# Patient Record
Sex: FEMALE | Race: WHITE | Employment: OTHER | URBAN - NONMETROPOLITAN AREA
[De-identification: names, ages, dates, MRNs, and addresses within clinical notes are randomized per-mention and may not be internally consistent; named-entity substitution may affect disease eponyms.]

---

## 2024-02-06 ENCOUNTER — OFFICE VISIT (OUTPATIENT)
Dept: FAMILY MEDICINE CLINIC | Age: 89
End: 2024-02-06
Payer: MEDICARE

## 2024-02-06 VITALS
DIASTOLIC BLOOD PRESSURE: 86 MMHG | HEART RATE: 64 BPM | WEIGHT: 145 LBS | HEIGHT: 62 IN | BODY MASS INDEX: 26.68 KG/M2 | OXYGEN SATURATION: 98 % | TEMPERATURE: 97.7 F | SYSTOLIC BLOOD PRESSURE: 130 MMHG

## 2024-02-06 DIAGNOSIS — R05.3 PERSISTENT COUGH: ICD-10-CM

## 2024-02-06 DIAGNOSIS — J40 BRONCHITIS: Primary | ICD-10-CM

## 2024-02-06 PROCEDURE — 99213 OFFICE O/P EST LOW 20 MIN: CPT | Performed by: EMERGENCY MEDICINE

## 2024-02-06 PROCEDURE — 1123F ACP DISCUSS/DSCN MKR DOCD: CPT | Performed by: EMERGENCY MEDICINE

## 2024-02-06 RX ORDER — GUAIFENESIN 600 MG/1
600 TABLET, EXTENDED RELEASE ORAL 2 TIMES DAILY
Qty: 30 TABLET | Refills: 0 | Status: SHIPPED | OUTPATIENT
Start: 2024-02-06 | End: 2024-02-21

## 2024-02-06 RX ORDER — CEFDINIR 300 MG/1
300 CAPSULE ORAL 2 TIMES DAILY
Qty: 20 CAPSULE | Refills: 0 | Status: SHIPPED | OUTPATIENT
Start: 2024-02-06 | End: 2024-02-16

## 2024-02-06 RX ORDER — BENZONATATE 100 MG/1
100 CAPSULE ORAL 3 TIMES DAILY PRN
Qty: 30 CAPSULE | Refills: 0 | Status: SHIPPED | OUTPATIENT
Start: 2024-02-06 | End: 2024-02-16

## 2024-02-06 RX ORDER — DONEPEZIL HYDROCHLORIDE 10 MG/1
10 TABLET, FILM COATED ORAL NIGHTLY
COMMUNITY

## 2024-02-06 RX ORDER — FERROUS SULFATE 325(65) MG
325 TABLET ORAL
COMMUNITY

## 2024-02-06 RX ORDER — MEMANTINE HYDROCHLORIDE 5 MG/1
5 TABLET ORAL 2 TIMES DAILY
COMMUNITY

## 2024-02-06 ASSESSMENT — ENCOUNTER SYMPTOMS
DIARRHEA: 0
EYE PAIN: 0
EYE REDNESS: 0
SINUS PRESSURE: 0
WHEEZING: 0
ABDOMINAL DISTENTION: 0
BACK PAIN: 0
EYE DISCHARGE: 0
SHORTNESS OF BREATH: 0
SORE THROAT: 0
VOMITING: 0
NAUSEA: 0
COUGH: 1

## 2024-02-06 NOTE — PROGRESS NOTES
orders for this visit:    Bronchitis  -     cefdinir (OMNICEF) 300 MG capsule; Take 1 capsule by mouth 2 times daily for 10 days  -     guaiFENesin (MUCINEX) 600 MG extended release tablet; Take 1 tablet by mouth 2 times daily for 15 days  -     benzonatate (TESSALON) 100 MG capsule; Take 1 capsule by mouth 3 times daily as needed for Cough    Persistent cough  -     XR CHEST (2 VW); Future  -     cefdinir (OMNICEF) 300 MG capsule; Take 1 capsule by mouth 2 times daily for 10 days  -     guaiFENesin (MUCINEX) 600 MG extended release tablet; Take 1 tablet by mouth 2 times daily for 15 days  -     benzonatate (TESSALON) 100 MG capsule; Take 1 capsule by mouth 3 times daily as needed for Cough         Discussed symptomatic treatments with the patient today.   Return if symptoms worsen or fail to improve.   Red flag symptoms were also discussed with the patient today. If symptoms worsen the patient is to go directly to the emergency department for reevaluation and treatment. Pt verbalizes understanding and is in agreement with plan of care. All questions answered.      New Medications     New Prescriptions    BENZONATATE (TESSALON) 100 MG CAPSULE    Take 1 capsule by mouth 3 times daily as needed for Cough    CEFDINIR (OMNICEF) 300 MG CAPSULE    Take 1 capsule by mouth 2 times daily for 10 days    GUAIFENESIN (MUCINEX) 600 MG EXTENDED RELEASE TABLET    Take 1 tablet by mouth 2 times daily for 15 days       Electronically signed by Kamaljit Kendall DO   DD: 2/6/24

## 2024-11-22 ENCOUNTER — HOSPITAL ENCOUNTER (INPATIENT)
Age: 89
LOS: 13 days | Discharge: HOME | DRG: 871 | End: 2024-12-05
Attending: INTERNAL MEDICINE | Admitting: INTERNAL MEDICINE
Payer: MEDICARE

## 2024-11-22 DIAGNOSIS — R00.1 BRADYCARDIA: Primary | ICD-10-CM

## 2024-11-22 PROBLEM — A41.9 SEPSIS (HCC): Status: ACTIVE | Noted: 2024-11-22

## 2024-11-22 PROBLEM — J90 PLEURAL EFFUSION: Status: ACTIVE | Noted: 2024-11-22

## 2024-11-22 PROBLEM — N30.00 ACUTE CYSTITIS WITHOUT HEMATURIA: Status: ACTIVE | Noted: 2024-11-22

## 2024-11-22 PROCEDURE — 3E033XZ INTRODUCTION OF VASOPRESSOR INTO PERIPHERAL VEIN, PERCUTANEOUS APPROACH: ICD-10-PCS | Performed by: INTERNAL MEDICINE

## 2024-11-22 PROCEDURE — 02HV33Z INSERTION OF INFUSION DEVICE INTO SUPERIOR VENA CAVA, PERCUTANEOUS APPROACH: ICD-10-PCS | Performed by: INTERNAL MEDICINE

## 2024-11-22 PROCEDURE — 87081 CULTURE SCREEN ONLY: CPT

## 2024-11-22 PROCEDURE — 93005 ELECTROCARDIOGRAM TRACING: CPT

## 2024-11-22 PROCEDURE — 99291 CRITICAL CARE FIRST HOUR: CPT

## 2024-11-22 PROCEDURE — 87086 URINE CULTURE/COLONY COUNT: CPT

## 2024-11-22 PROCEDURE — 87040 BLOOD CULTURE FOR BACTERIA: CPT

## 2024-11-22 PROCEDURE — 87150 DNA/RNA AMPLIFIED PROBE: CPT

## 2024-11-22 PROCEDURE — 87077 CULTURE AEROBIC IDENTIFY: CPT

## 2024-11-22 PROCEDURE — 2580000003 HC RX 258

## 2024-11-22 PROCEDURE — 0JH63XZ INSERTION OF TUNNELED VASCULAR ACCESS DEVICE INTO CHEST SUBCUTANEOUS TISSUE AND FASCIA, PERCUTANEOUS APPROACH: ICD-10-PCS | Performed by: INTERNAL MEDICINE

## 2024-11-22 PROCEDURE — 2000000000 HC ICU R&B

## 2024-11-22 PROCEDURE — 82533 TOTAL CORTISOL: CPT

## 2024-11-22 PROCEDURE — 2500000003 HC RX 250 WO HCPCS

## 2024-11-22 RX ORDER — MAGNESIUM SULFATE IN WATER 40 MG/ML
2000 INJECTION, SOLUTION INTRAVENOUS PRN
Status: DISCONTINUED | OUTPATIENT
Start: 2024-11-22 | End: 2024-12-05 | Stop reason: HOSPADM

## 2024-11-22 RX ORDER — SODIUM CHLORIDE 0.9 % (FLUSH) 0.9 %
5-40 SYRINGE (ML) INJECTION EVERY 12 HOURS SCHEDULED
Status: DISCONTINUED | OUTPATIENT
Start: 2024-11-22 | End: 2024-12-05 | Stop reason: HOSPADM

## 2024-11-22 RX ORDER — SODIUM CHLORIDE 9 MG/ML
INJECTION, SOLUTION INTRAVENOUS PRN
Status: DISCONTINUED | OUTPATIENT
Start: 2024-11-22 | End: 2024-12-05 | Stop reason: HOSPADM

## 2024-11-22 RX ORDER — ENOXAPARIN SODIUM 100 MG/ML
30 INJECTION SUBCUTANEOUS DAILY
Status: DISCONTINUED | OUTPATIENT
Start: 2024-11-23 | End: 2024-12-02 | Stop reason: DRUGHIGH

## 2024-11-22 RX ORDER — 0.9 % SODIUM CHLORIDE 0.9 %
1000 INTRAVENOUS SOLUTION INTRAVENOUS ONCE
Status: COMPLETED | OUTPATIENT
Start: 2024-11-22 | End: 2024-11-23

## 2024-11-22 RX ORDER — SODIUM CHLORIDE 0.9 % (FLUSH) 0.9 %
5-40 SYRINGE (ML) INJECTION PRN
Status: DISCONTINUED | OUTPATIENT
Start: 2024-11-22 | End: 2024-12-05 | Stop reason: HOSPADM

## 2024-11-22 RX ORDER — ONDANSETRON 4 MG/1
4 TABLET, ORALLY DISINTEGRATING ORAL EVERY 8 HOURS PRN
Status: DISCONTINUED | OUTPATIENT
Start: 2024-11-22 | End: 2024-12-05 | Stop reason: HOSPADM

## 2024-11-22 RX ORDER — GLUCAGON 1 MG/ML
1 KIT INJECTION PRN
Status: DISCONTINUED | OUTPATIENT
Start: 2024-11-22 | End: 2024-12-05 | Stop reason: HOSPADM

## 2024-11-22 RX ORDER — POTASSIUM CHLORIDE 7.45 MG/ML
10 INJECTION INTRAVENOUS PRN
Status: DISCONTINUED | OUTPATIENT
Start: 2024-11-22 | End: 2024-11-22 | Stop reason: RX

## 2024-11-22 RX ORDER — PANTOPRAZOLE SODIUM 40 MG/1
40 TABLET, DELAYED RELEASE ORAL
Status: DISCONTINUED | OUTPATIENT
Start: 2024-11-23 | End: 2024-12-05 | Stop reason: HOSPADM

## 2024-11-22 RX ORDER — SODIUM CHLORIDE 9 MG/ML
INJECTION, SOLUTION INTRAVENOUS CONTINUOUS
Status: DISCONTINUED | OUTPATIENT
Start: 2024-11-22 | End: 2024-11-24

## 2024-11-22 RX ORDER — ACETAMINOPHEN 325 MG/1
650 TABLET ORAL EVERY 6 HOURS PRN
Status: DISCONTINUED | OUTPATIENT
Start: 2024-11-22 | End: 2024-12-05 | Stop reason: HOSPADM

## 2024-11-22 RX ORDER — HYDROCORTISONE SODIUM SUCCINATE 100 MG/2ML
100 INJECTION INTRAMUSCULAR; INTRAVENOUS EVERY 8 HOURS
Status: DISCONTINUED | OUTPATIENT
Start: 2024-11-23 | End: 2024-11-26

## 2024-11-22 RX ORDER — DEXTROSE MONOHYDRATE 100 MG/ML
INJECTION, SOLUTION INTRAVENOUS CONTINUOUS PRN
Status: DISCONTINUED | OUTPATIENT
Start: 2024-11-22 | End: 2024-12-05 | Stop reason: HOSPADM

## 2024-11-22 RX ORDER — POLYETHYLENE GLYCOL 3350 17 G/17G
17 POWDER, FOR SOLUTION ORAL DAILY PRN
Status: DISCONTINUED | OUTPATIENT
Start: 2024-11-22 | End: 2024-12-05 | Stop reason: HOSPADM

## 2024-11-22 RX ORDER — POTASSIUM CHLORIDE 1500 MG/1
40 TABLET, EXTENDED RELEASE ORAL PRN
Status: DISCONTINUED | OUTPATIENT
Start: 2024-11-22 | End: 2024-12-05 | Stop reason: HOSPADM

## 2024-11-22 RX ORDER — ONDANSETRON 2 MG/ML
4 INJECTION INTRAMUSCULAR; INTRAVENOUS EVERY 6 HOURS PRN
Status: DISCONTINUED | OUTPATIENT
Start: 2024-11-22 | End: 2024-12-05 | Stop reason: HOSPADM

## 2024-11-22 RX ORDER — ACETAMINOPHEN 650 MG/1
650 SUPPOSITORY RECTAL EVERY 6 HOURS PRN
Status: DISCONTINUED | OUTPATIENT
Start: 2024-11-22 | End: 2024-12-05 | Stop reason: HOSPADM

## 2024-11-22 RX ADMIN — SODIUM CHLORIDE 1000 ML: 9 INJECTION, SOLUTION INTRAVENOUS at 23:26

## 2024-11-22 RX ADMIN — SODIUM CHLORIDE, PRESERVATIVE FREE 10 ML: 5 INJECTION INTRAVENOUS at 23:49

## 2024-11-22 RX ADMIN — SODIUM CHLORIDE 6 MCG/MIN: 9 INJECTION, SOLUTION INTRAVENOUS at 22:53

## 2024-11-23 ENCOUNTER — APPOINTMENT (OUTPATIENT)
Dept: GENERAL RADIOLOGY | Age: 89
DRG: 871 | End: 2024-11-23
Attending: INTERNAL MEDICINE
Payer: MEDICARE

## 2024-11-23 LAB
ALBUMIN SERPL-MCNC: 2 G/DL (ref 3.5–5.2)
ALP SERPL-CCNC: 82 U/L (ref 35–104)
ALT SERPL-CCNC: <5 U/L (ref 0–32)
ANION GAP SERPL CALCULATED.3IONS-SCNC: 8 MMOL/L (ref 7–16)
APPEARANCE FLD: CLEAR
AST SERPL-CCNC: 7 U/L (ref 0–31)
BASOPHILS # BLD: 0.05 K/UL (ref 0–0.2)
BASOPHILS NFR BLD: 0 % (ref 0–2)
BILIRUB SERPL-MCNC: 0.3 MG/DL (ref 0–1.2)
BODY FLD TYPE: NORMAL
BUN SERPL-MCNC: 32 MG/DL (ref 6–23)
CALCIUM SERPL-MCNC: 8.1 MG/DL (ref 8.6–10.2)
CHLORIDE SERPL-SCNC: 109 MMOL/L (ref 98–107)
CLOT CHECK: NORMAL
CO2 SERPL-SCNC: 22 MMOL/L (ref 22–29)
COLOR FLD: YELLOW
CORTIS SERPL-MCNC: 20.4 UG/DL (ref 2.7–18.4)
CREAT SERPL-MCNC: 1.8 MG/DL (ref 0.5–1)
CRP SERPL HS-MCNC: 129 MG/L (ref 0–5)
EOSINOPHIL # BLD: 0.01 K/UL (ref 0.05–0.5)
EOSINOPHILS RELATIVE PERCENT: 0 % (ref 0–6)
ERYTHROCYTE [DISTWIDTH] IN BLOOD BY AUTOMATED COUNT: 18.3 % (ref 11.5–15)
GFR, ESTIMATED: 27 ML/MIN/1.73M2
GLUCOSE FLD-MCNC: 156 MG/DL
GLUCOSE SERPL-MCNC: 117 MG/DL (ref 74–99)
HCT VFR BLD AUTO: 29.1 % (ref 34–48)
HGB BLD-MCNC: 8.5 G/DL (ref 11.5–15.5)
IMM GRANULOCYTES # BLD AUTO: 0.15 K/UL (ref 0–0.58)
IMM GRANULOCYTES NFR BLD: 1 % (ref 0–5)
INR PPP: 1.4
LACTATE BLDV-SCNC: 0.8 MMOL/L (ref 0.5–2.2)
LDH FLD L TO P-CCNC: 87 U/L
LDH SERPL-CCNC: 125 U/L (ref 135–214)
LYMPHOCYTES NFR BLD: 1.66 K/UL (ref 1.5–4)
LYMPHOCYTES RELATIVE PERCENT: 9 % (ref 20–42)
MAGNESIUM SERPL-MCNC: 1.6 MG/DL (ref 1.6–2.6)
MCH RBC QN AUTO: 25.7 PG (ref 26–35)
MCHC RBC AUTO-ENTMCNC: 29.2 G/DL (ref 32–34.5)
MCV RBC AUTO: 87.9 FL (ref 80–99.9)
MONOCYTES NFR BLD: 0.17 K/UL (ref 0.1–0.95)
MONOCYTES NFR BLD: 1 % (ref 2–12)
MONOCYTES NFR FLD: 70 %
NEUTROPHILS NFR BLD: 89 % (ref 43–80)
NEUTROPHILS NFR FLD: 30 %
NEUTS SEG NFR BLD: 17.06 K/UL (ref 1.8–7.3)
PHOSPHATE SERPL-MCNC: 3.9 MG/DL (ref 2.5–4.5)
PLATELET # BLD AUTO: 292 K/UL (ref 130–450)
PMV BLD AUTO: 8.5 FL (ref 7–12)
POTASSIUM SERPL-SCNC: 4.1 MMOL/L (ref 3.5–5)
PROT FLD-MCNC: 3.3 G/DL
PROT SERPL-MCNC: 4.9 G/DL (ref 6.4–8.3)
PROT SERPL-MCNC: 5.3 G/DL (ref 6.4–8.3)
PROTHROMBIN TIME: 15.1 SEC (ref 9.3–12.4)
RBC # BLD AUTO: 3.31 M/UL (ref 3.5–5.5)
RBC # FLD: 2000 CELLS/UL
SODIUM SERPL-SCNC: 139 MMOL/L (ref 132–146)
SPECIMEN TYPE: NORMAL
T4 FREE SERPL-MCNC: 0.8 NG/DL (ref 0.9–1.7)
TSH SERPL DL<=0.05 MIU/L-ACNC: 2.66 UIU/ML (ref 0.27–4.2)
WBC # FLD: 164 CELLS/UL
WBC OTHER # BLD: 19.1 K/UL (ref 4.5–11.5)

## 2024-11-23 PROCEDURE — 84443 ASSAY THYROID STIM HORMONE: CPT

## 2024-11-23 PROCEDURE — 2580000003 HC RX 258

## 2024-11-23 PROCEDURE — 83735 ASSAY OF MAGNESIUM: CPT

## 2024-11-23 PROCEDURE — 71045 X-RAY EXAM CHEST 1 VIEW: CPT

## 2024-11-23 PROCEDURE — 84157 ASSAY OF PROTEIN OTHER: CPT

## 2024-11-23 PROCEDURE — 2580000003 HC RX 258: Performed by: STUDENT IN AN ORGANIZED HEALTH CARE EDUCATION/TRAINING PROGRAM

## 2024-11-23 PROCEDURE — 83615 LACTATE (LD) (LDH) ENZYME: CPT

## 2024-11-23 PROCEDURE — 0W9B3ZZ DRAINAGE OF LEFT PLEURAL CAVITY, PERCUTANEOUS APPROACH: ICD-10-PCS | Performed by: STUDENT IN AN ORGANIZED HEALTH CARE EDUCATION/TRAINING PROGRAM

## 2024-11-23 PROCEDURE — 2700000000 HC OXYGEN THERAPY PER DAY

## 2024-11-23 PROCEDURE — 6370000000 HC RX 637 (ALT 250 FOR IP)

## 2024-11-23 PROCEDURE — 88112 CYTOPATH CELL ENHANCE TECH: CPT

## 2024-11-23 PROCEDURE — 88342 IMHCHEM/IMCYTCHM 1ST ANTB: CPT

## 2024-11-23 PROCEDURE — 84439 ASSAY OF FREE THYROXINE: CPT

## 2024-11-23 PROCEDURE — 88341 IMHCHEM/IMCYTCHM EA ADD ANTB: CPT

## 2024-11-23 PROCEDURE — 84100 ASSAY OF PHOSPHORUS: CPT

## 2024-11-23 PROCEDURE — 87205 SMEAR GRAM STAIN: CPT

## 2024-11-23 PROCEDURE — 83605 ASSAY OF LACTIC ACID: CPT

## 2024-11-23 PROCEDURE — 85610 PROTHROMBIN TIME: CPT

## 2024-11-23 PROCEDURE — 2000000000 HC ICU R&B

## 2024-11-23 PROCEDURE — 80053 COMPREHEN METABOLIC PANEL: CPT

## 2024-11-23 PROCEDURE — 87070 CULTURE OTHR SPECIMN AEROBIC: CPT

## 2024-11-23 PROCEDURE — 36592 COLLECT BLOOD FROM PICC: CPT

## 2024-11-23 PROCEDURE — 85025 COMPLETE CBC W/AUTO DIFF WBC: CPT

## 2024-11-23 PROCEDURE — 86140 C-REACTIVE PROTEIN: CPT

## 2024-11-23 PROCEDURE — 89051 BODY FLUID CELL COUNT: CPT

## 2024-11-23 PROCEDURE — 6360000002 HC RX W HCPCS

## 2024-11-23 PROCEDURE — 84155 ASSAY OF PROTEIN SERUM: CPT

## 2024-11-23 PROCEDURE — 82945 GLUCOSE OTHER FLUID: CPT

## 2024-11-23 PROCEDURE — 88305 TISSUE EXAM BY PATHOLOGIST: CPT

## 2024-11-23 RX ORDER — SODIUM CHLORIDE, SODIUM LACTATE, POTASSIUM CHLORIDE, AND CALCIUM CHLORIDE .6; .31; .03; .02 G/100ML; G/100ML; G/100ML; G/100ML
1000 INJECTION, SOLUTION INTRAVENOUS ONCE
Status: COMPLETED | OUTPATIENT
Start: 2024-11-23 | End: 2024-11-23

## 2024-11-23 RX ORDER — AMLODIPINE BESYLATE 2.5 MG/1
2.5 TABLET ORAL DAILY
COMMUNITY

## 2024-11-23 RX ORDER — SODIUM BICARBONATE 650 MG/1
650 TABLET ORAL 3 TIMES DAILY
COMMUNITY

## 2024-11-23 RX ORDER — GABAPENTIN 100 MG/1
100 CAPSULE ORAL 2 TIMES DAILY
COMMUNITY

## 2024-11-23 RX ORDER — ONDANSETRON 4 MG/1
4 TABLET, ORALLY DISINTEGRATING ORAL EVERY 8 HOURS PRN
COMMUNITY

## 2024-11-23 RX ADMIN — HYDROCORTISONE SODIUM SUCCINATE 100 MG: 100 INJECTION INTRAMUSCULAR; INTRAVENOUS at 22:39

## 2024-11-23 RX ADMIN — ENOXAPARIN SODIUM 30 MG: 100 INJECTION SUBCUTANEOUS at 08:33

## 2024-11-23 RX ADMIN — PANTOPRAZOLE SODIUM 40 MG: 40 TABLET, DELAYED RELEASE ORAL at 05:46

## 2024-11-23 RX ADMIN — PIPERACILLIN AND TAZOBACTAM 3375 MG: 3; .375 INJECTION, POWDER, LYOPHILIZED, FOR SOLUTION INTRAVENOUS at 12:16

## 2024-11-23 RX ADMIN — SODIUM CHLORIDE, POTASSIUM CHLORIDE, SODIUM LACTATE AND CALCIUM CHLORIDE 1000 ML: 600; 310; 30; 20 INJECTION, SOLUTION INTRAVENOUS at 11:08

## 2024-11-23 RX ADMIN — HYDROCORTISONE SODIUM SUCCINATE 100 MG: 100 INJECTION INTRAMUSCULAR; INTRAVENOUS at 00:04

## 2024-11-23 RX ADMIN — MAGNESIUM SULFATE HEPTAHYDRATE 2000 MG: 40 INJECTION, SOLUTION INTRAVENOUS at 07:36

## 2024-11-23 RX ADMIN — PIPERACILLIN AND TAZOBACTAM 3375 MG: 3; .375 INJECTION, POWDER, LYOPHILIZED, FOR SOLUTION INTRAVENOUS at 20:18

## 2024-11-23 RX ADMIN — SODIUM CHLORIDE: 9 INJECTION, SOLUTION INTRAVENOUS at 22:37

## 2024-11-23 RX ADMIN — PIPERACILLIN AND TAZOBACTAM 3375 MG: 3; .375 INJECTION, POWDER, LYOPHILIZED, FOR SOLUTION INTRAVENOUS at 03:34

## 2024-11-23 RX ADMIN — HYDROCORTISONE SODIUM SUCCINATE 100 MG: 100 INJECTION INTRAMUSCULAR; INTRAVENOUS at 08:32

## 2024-11-23 RX ADMIN — HYDROCORTISONE SODIUM SUCCINATE 100 MG: 100 INJECTION INTRAMUSCULAR; INTRAVENOUS at 17:23

## 2024-11-23 RX ADMIN — SODIUM CHLORIDE, PRESERVATIVE FREE 10 ML: 5 INJECTION INTRAVENOUS at 20:15

## 2024-11-23 RX ADMIN — SODIUM CHLORIDE: 9 INJECTION, SOLUTION INTRAVENOUS at 01:36

## 2024-11-23 RX ADMIN — SODIUM CHLORIDE: 9 INJECTION, SOLUTION INTRAVENOUS at 10:30

## 2024-11-23 RX ADMIN — SODIUM CHLORIDE, PRESERVATIVE FREE 10 ML: 5 INJECTION INTRAVENOUS at 08:33

## 2024-11-23 ASSESSMENT — PAIN SCALES - PAIN ASSESSMENT IN ADVANCED DEMENTIA (PAINAD)
BREATHING: NORMAL
CONSOLABILITY: NO NEED TO CONSOLE
TOTALSCORE: 0
BREATHING: NORMAL
BODYLANGUAGE: RELAXED
FACIALEXPRESSION: SMILING OR INEXPRESSIVE
BODYLANGUAGE: RELAXED
TOTALSCORE: 0
CONSOLABILITY: NO NEED TO CONSOLE
FACIALEXPRESSION: SMILING OR INEXPRESSIVE
TOTALSCORE: 0
FACIALEXPRESSION: SMILING OR INEXPRESSIVE
BODYLANGUAGE: RELAXED
CONSOLABILITY: NO NEED TO CONSOLE
BREATHING: NORMAL

## 2024-11-23 NOTE — FLOWSHEET NOTE
Patient admitted from [facility] ER per ambulance to 207, with the following belongings; shirt and pants, placed on monitor, patient oriented to room and unit visiting hours.  Patient guide at bedside, reviewed patient rights and responsibilities. MRSA nasal swab obtained.  Bed alarm on.  Call light within reach.

## 2024-11-23 NOTE — CONSULTS
Critical Care Admit/Consult Note         Patient - Estefany Levine   MRN -  [id number]   [id number]   - 1935      Date of Admission -  2024 10:25 PM  Date of evaluation -  2024/207-A   Hospital Day - 0            ADMIT/CONSULT DETAILS     Reason for Admit/Consult   Septic shock    Consulting Service/Physician   Consulting - Caity Ann MD  Primary Care Physician - Yaw Pak,          HPI   The patient is a 89 y.o. female with significant past medical history of diabetes, CKD, HLD, HTN, OA, osteoporosis, uterine cancer, dementia, patient presented to outside emergency department ([facility]) from [facility] for evaluation of hypoxia, fatigue, and nausea and vomiting.  Per EMS report reported fever of 99, heart rate in the 110s, systolic blood pressure in the 80s.  She was placed on 10 L NRB and satting 100%.  EKG at outside facility reveals sinus tachycardia with rate of 104, occasional ectopy and no acute ST elevation or depression.  Labs at outside facility reveal white count 19.7, Hgb of 11.6, lactic acid 3.8, sodium 139, K+ 4.3, BUN 29, creatinine 1.53, magnesium 1.2, troponin 5.  UA was positive and a Montanez catheter was placed blood and urine cultures were drawn and she was given broad-spectrum antibiotics with vancomycin and Zosyn.  She received 2 L of IV fluids and systolics remained in the 80s 70s a central line was placed and she was initiated on norepinephrine.  No beds available at [facility] therefore transfer was initiated to [facility].  Patient was seen and examined here in ICU she is alert but not oriented she is hypotensive and maintained on norepinephrine.     Of note patient recently seen at [facility] for bilateral hydronephrosis with no stone involvement and treated for UTI was treated with nitrofurantoin and switch to Keflex.     Past Medical History         Diagnosis Date    Cataract     bilat    Chronic kidney disease, stage 3 (HCC)      and therapeutic thoracentesis check INR    Cardiovascular   # Shock   - 2/2 Septic shock and likely hypovolemia  - EKG and troponin at outside facility unremarkable, repeat troponin and EKG  - Check cortisol level, start stress dose steroids, continue fluid resuscitation continue norepinephrine for MAP > 65    # HTN  - Hold home meds    Gastrointestinal   # Nausea/vomiting  - Hx of uterine cancer s/p MOHINI and BSO, XRT and brachytherapy, s/p gastric bypass, s/p hernia repair x 2, s/p right colectomy for severe dysplasia colocutaneous fistula and associated 6 cm phlegmon since [facility] and not a surgical candidate  - CTAP no acute changes and stable chronic findings  - As needed antiemetic    # 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  - Repeat blood and urine culture, baseline CRP and PCT  - Continue Zosyn for now pending further workup will consult infectious ease; prior to recommendations    Hematology/Oncology   # Anemia  - Hgb stable 11.6, Daily CBC and transfuse for Hgb < 7    # Uterine cancer  - S/p MOHINI and BSO as well as XRT and brachytherapy     Endocrine   # Subclinical hypothyroidism  - TSH 2.39 Free T4 7.8 at outside facility, will repeat     # Type II DM  - Hold metformin  - Hypoglycemic protocol    # Neuropathy  - Hold gabapentin for now    Musculoskeletal   # Chronic subluxation of right shoulder  - 2/2 rotator cuff insufficiency    Social/Spiritual/DNR/Other   CODE STATUS:  Full code  DVT prophylaxis: Lovenox  GI prophylaxis: Protonix        \"Thank you for asking us to see this complex patient.\"     Case discussed with Dr. Collier  Electronically signed by MACK Power CNP on 11/22/2024 at 11:22 PM    Total critical care time caring for this patient with life threatening, unstable organ failure, including direct patient contact, management of life support systems, review of data including imaging and labs, discussions with other team members and physicians at least 40  Min so far today, excluding procedures. Please feel free to call with questions or concerns.     NOTE: This report was transcribed using voice recognition software. Every effort was made to ensure accuracy; however, inadvertent computerized transcription errors may be present.

## 2024-11-23 NOTE — H&P
Department of Internal Medicine  Peggy Guido MD    Estefany Levine  [id number]  1935  CHIEF COMPLAINT:  Sepsis (HCC)   History Obtained From: EMR  HISTORY OF PRESENT ILLNESS:    The patient is a 89 y.o. female who presents with fatigue, fever, nausea and vomiting.  When asked she was not sure why she is here.  She states she lives with her daughter.  As per EMS her blood pressure was also low.  Systolic blood pressure in the 80s.  She was hypoxic as well and was placed on NRB.  She was recently seen at [facility] for bilateral hydronephrosis and treated for UTI with nitrofurantoin and then switched to Keflex.  Has history of diabetes mellitus, hypertension, hyperlipidemia, osteoarthritis, chronic kidney disease, uterine cancer, dementia.  She is very pleasant and seems comfortable.  Has a breakfast in front of her this morning.  She has no complaints but to be noted that she is a poor historian.  Past Medical History:        Diagnosis Date    Cataract     bilat    Chronic kidney disease, stage 3 (HCC)     Diabetes mellitus (HCC)     Diarrhea     Gout     Hypertension     Obesity     Osteoarthritis     Osteoarthritis     Osteoporosis     Recurrent UTI     Uterine cancer (HCC)      Past Surgical History:        Procedure Laterality Date    ANKLE SURGERY      pinning    APPENDECTOMY      COLON SURGERY      COLONOSCOPY  09/22/2020    Dr. Najera    CT NEEDLE BIOPSY LUNG PERCUTANEOUS  3/24/2022    CT NEEDLE BIOPSY LUNG PERCUTANEOUS 3/24/2022    FOOT SURGERY Right     GASTRIC BYPASS SURGERY      HERNIA REPAIR      x2    HYSTERECTOMY (CERVIX STATUS UNKNOWN)  12/2001    SPINE SURGERY  04/20/2015    L4-L5 fusion  Dr Salvador     TONSILLECTOMY       Meds at Home:  Medications Prior to Admission: amLODIPine (NORVASC) 2.5 MG tablet, Take 1 tablet by mouth daily Hold if SBP < 120  sodium chloride 0.9 % SOLN 100 mL with ferric gluconate 12.5 MG/ML SOLN 125 mg, Infuse 125 mg intravenously every 7 days THURSDAYS  gabapentin

## 2024-11-23 NOTE — INTERDISCIPLINARY ROUNDS
Intensive Care Daily Quality Rounding Checklist      ICU Team Members: Dr. Lee, Resident Dr. Kerr, Bedside nurse, Charge nurse, Respiratory therapist     ICU Day #: NUMBER: 2    SOFA Score: 3    Intubation Date:  N/A    Ventilator Day #: N/A    Central Line Insertion Date: November 22        Day #: NUMBER: 2        Indication: CVCIndication: Hemodynamically unstable requiring volume resuscitation     Arterial Line Insertion Date: November 22      Day #: NUMBER: 2    Temporary Hemodialysis Catheter Insertion Date:  N/A      Day # N/A    DVT Prophylaxis: Lovenox    GI Prophylaxis: Protonix    Montanez Catheter Insertion Date: November 22       Day #: 2      Indications: Need for fluid management of the critically ill patient in a critical care setting      Continued need (if yes, reason documented and discussed with physician): Yes, pressor    Skin Issues/ Wounds and ordered treatment discussed on rounds: Yes, wound    Goals/ Plans for the Day: Monitor labs and vitals, treat/replace as needed.  Wean pressors as able, check NICOM.  Continue Zosyn.  Plan for possible thoracentesis.     Reviewed plan and goals for day with patient and/or representative: No, patient confused.

## 2024-11-23 NOTE — PROGRESS NOTES
SVI CI HR TFC MAP TPRI   Baseline 28 1.8 66 78.3 65 2821   Test 35 2.2 64 81.9 67 2448   % Change 24.9% 18.8 -3.7% 4.7% 3.1 -13.2   noninvasive -  24.9 % fluid responsive

## 2024-11-23 NOTE — CONSULTS
[facility]  [facility]    Consultation Note     Admit Date: 11/22/2024 10:25 PM    Reason for Consult:   Sepsis/UTI    Attending Physician:  Peggy Guido MD     Chief Complaint: none    HISTORY OF PRESENT ILLNESS:   The patient is a 89 y.o.  female not known to the Infectious Diseases service. The patient has complex surgical history with known colocutaneous fistula for many years, history of uterine cancer status post MOHINI/BSO and radiation therapy and brachytherapy history of gastric bypass surgery.  Patient was recently admitted to [facility] from 10/27 till 10/31.  Patient was treated with IV Zosyn and Diflucan was found to have right shoulder dislocation.  Patient was also found to have a left pleural effusion thought to be related to metastasis.  Antibiotics were switched to oral Keflex and patient was discharged.  Patient presented to ER from [facility] with nausea, vomiting, and hypoxia. Patient is sleepy did not provide any hx. She denied any pain any where.     Since admission patient is afebrile hypotensive admitted to ICU currently on Levophed saturating well on 2 L nasal cannula.  Labs showed white count of 19 hemoglobin is 8.5 platelet count is 292 BMP is normal CRP is 129 and BNP is 142 Pro-Lopez is 6.6 creatinine is 1.8 increased from 1.5 yesterday/BUN of 32.  UA shows pyuria patient tested negative for flu A/B/SARS-CoV-2 blood cultures are in process urine culture is in process MRSA nasal screen is in process.  CT chest showedVery large left pleural effusion encompassing nearly the entire left hemithorax with volume    loss of the underlying left lung.  No central mass lesion.  Shift of the mediastinum suspected on    plain films is not seen on CT.   CT abdomen pelvis showedStable open wound over the symphysis pubis with gas and debris most likely communicating with    the sigmoid colon when correlated with past study separation of the symphysis pubis with    Consulted gen surgery: any surgical options??  Monitor labs  Will follow with you    Thank you for having us see this patient in consultation. I will be discussing this case with the treating physicians.      Electronically signed by TIKA CHOI MD on 11/23/2024 at 10:13 AM

## 2024-11-23 NOTE — CONSULTS
Surgery Consult  JIMBO Obrien MD FACS    Reason for consult: chronic colocutaneous fistula    HPI  89 y.o. female with significant past medical history of diabetes, CKD, HLD, HTN, OA, osteoporosis, uterine cancer, uterine cancer s/p MOHINI and BSO and XRT and brachytherapy, s/p gastric bypass, s/p hernia repair x2, s/p R colectomy for severe dysplasia and colo-cutaneous fistula and associated 6cm phlegmon since 2020 .  Known fistula for many years.  10/27 was evaluated at [facility] for this and noted to have mesh protruding form fistula which was a newer development.  Plan was for colorectal team to evaluate the patient there but I do not see any notes for this.  The plan at that time was just to sharply excise any exposed mesh with hopes that the wound would contract.  CT from [facility] with colocutaneous fistula from sigmoid colon. She was to f/u with CRS at [facility] in the coming weeks. Patient presented to outside emergency department ([facility]) from [facility] for evaluation of hypoxia, fatigue, and nausea and vomiting.  UA was positive and a Montanez catheter was placed.  Blood and urine cultures were drawn and she was given broad-spectrum antibiotics with vancomycin and Zosyn.  Patient started on norepinephrine.  No beds available at [facility] therefore transfer was initiated to [facility].  She has been deemed not a surgical candidate for this given her complex surgical hx and hx of radiation.  This fistula has been managed with pouching and routine wound care.  UA with pyuria and imaging with large left pleural effusion encompassing the entire left hemithorax.  Attempted to upload the imaging disc but the images would not load.  CT report from [place] on 11/22 reviewed.      Past Medical History:   Diagnosis Date    Cataract     bilat    Chronic kidney disease, stage 3 (HCC)     Diabetes mellitus (HCC)     Diarrhea     Gout     Hypertension     Obesity     Osteoarthritis     Osteoarthritis     Osteoporosis                                   Signed    Patient: NAMAN STOKES                                                                  MR#: [id number]          : 1935                                                                Acct:[id number]            Age/Sex: 89 / F                                                                Admit Date: 24            Loc: ER                                                                                     Attending Dr:     Ordering Physician: Andrés Cash MD   Date of Service: 24  Procedure(s): CT angio chest PE protocol  Accession Number(s): [id number]    cc: Andrés Cash MD      HISTORY:  Tachycardia and hypotension.  Abnormal chest x-ray.     PHYSICIAN INDICATIONS:  septic, r/o pe, concern for tumor     TECHNIQUE: High resolution axial CT images of the chest with three-dimensional reconstruction on  separate workstation.  Study was performed with intravenous contrast for CT Angiogram.  CT Dose  Index: 3.8 mGy.  DLP: 8 mGy-cm AEC.  k=0.014 mSv/(mGy-cm)     FINDINGS:     The pulmonary arteries appear normal in caliber without filling defects to suggest pulmonary  emboli.     The aorta is normal in caliber without aneurysm, dissection or rupture.  The heart size is normal.     Very large left pleural effusion encompassing nearly the entire left hemithorax with volume loss of  the underlying left lung.  No central mass lesion.  Shift of the mediastinum suspected on plain  films is not seen on CT.     Small right pleural effusion with atelectasis in the right lung base.     No lung nodules are identified. The mediastinum is unremarkable with no significant adenopathy  identified. The hilar regions are unremarkable.     In the upper abdomen, visualized portions of the liver, adrenal glands, kidneys and spleen are  unremarkable.     IMPRESSION:     CT Angiogram Chest and Pulmonary Arteries with contrast and high resolution 2-D and 3-D

## 2024-11-23 NOTE — PROCEDURES
PROCEDURE NOTE  Date: 11/23/2024   Name: Estefany Levine  YOB: 1935      Thoracentesis Procedure Note  Indication: Pleural effusion, removal of fluid for diagnostic purposes, and removal of fluid for therapeutic purposes    Consent: The family members were counseled regarding the procedure, it's indications, risks, potential complications and alternatives and any questions were answered. Consent was obtained.    Procedure: The patient was placed in the sitting position, supported by a bed side stand and the appropriate landmarks were identified.  The skin over the puncture site in the left posterior chest wall was prepped with betadine and draped in a sterile fashion.  Local anesthesia was obtained by infiltration using 1% Lidocaine without epinephrine. A thoracentesis catheter was then advanced into the pleural space over a needle and the needle was withdrawn.  Pleural fluid was returned which was clear. A total volume of 2100 cc was withdrawn which was sent to the lab for appropriate testing.  The catheter was then withdrawn and a sterile dressing was placed over the site.  A post procedure film showed a decrease in the size of the effusion.    The patient tolerated the procedure well.    Complications: None     I was present to provide direct supervision for the above services.    Mike Lee MD  11/23/2024 4:58 PM

## 2024-11-23 NOTE — PROGRESS NOTES
Spiritual Health History and Assessment/Progress Note  [facility]    Attempted Encounter,  ,  ,      Name: Estefany Levine MRN: [id number]    Age: 89 y.o.     Sex: female   Language: English   Caodaism: None   Sepsis (HCC)     Date: 11/23/2024                           Spiritual Assessment began in [address] ICU        Referral/Consult From: Rounding   Encounter Overview/Reason: Attempted Encounter  Service Provided For: Patient    Giovanna, Belief, Meaning:   Patient unable to assess at this time  Family/Friends No family/friends present      Importance and Influence:  Patient unable to assess at this time  Family/Friends No family/friends present    Community:  Patient Patient was sleeping.   prayed a silent prayer for the patient   Family/Friends No family/friends present    Assessment and Plan of Care:     Patient Interventions include: Patient was sleeping.   prayed a silent prayer for the patient   Family/Friends Interventions include: No family/friends present    Patient Plan of Care: Spiritual Care available upon further referral  Family/Friends Plan of Care: Spiritual Care available upon further referral    Electronically signed by Chaplain Mone on 11/23/2024 at 10:21 AM

## 2024-11-23 NOTE — FLOWSHEET NOTE
4 Eyes Skin Assessment     NAME:  Estefany Levine  YOB: 1935  MEDICAL RECORD NUMBER:  [id number]    The patient is being assessed for  Admission    I agree that at least one RN has performed a thorough Head to Toe Skin Assessment on the patient. ALL assessment sites listed below have been assessed.      Areas assessed by both nurses:    Head, Face, Ears, Shoulders, Back, Chest, Arms, Elbows, Hands, Sacrum. Buttock, Coccyx, Ischium, Legs. Feet and Heels, and Under Medical Devices         Does the Patient have a Wound? Yes wound(s) were present on assessment. LDA wound assessment was Initiated and completed by RN       Scott Prevention initiated by RN: Yes  Wound Care Orders initiated by RN: Yes    Pressure Injury (Stage 3,4, Unstageable, DTI, NWPT, and Complex wounds) if present, place Wound referral order by RN under : Yes    New Ostomies, if present place, Ostomy referral order under : Yes     Nurse 1 eSignature: Electronically signed by Kaela Chu RN on 11/23/24 at 7:02 AM EST    **SHARE this note so that the co-signing nurse can place an eSignature**    Nurse 2 eSignature: {Esignature:[id number]}

## 2024-11-23 NOTE — FORENSIC NURSE
4 Eyes Skin Assessment     NAME:  Estefany Levine  YOB: 1935  MEDICAL RECORD NUMBER:  [id number]    The patient is being assessed for  Admission    I agree that at least one RN has performed a thorough Head to Toe Skin Assessment on the patient. ALL assessment sites listed below have been assessed.      Areas assessed by both nurses:    Head, Face, Ears, Shoulders, Back, Chest, Arms, Elbows, Hands, Sacrum. Buttock, Coccyx, Ischium, Legs. Feet and Heels, and Under Medical Devices         Does the Patient have a Wound? Yes wound(s) were present on assessment. LDA wound assessment was Initiated and completed by RN       Scott Prevention initiated by RN: Yes  Wound Care Orders initiated by RN: Yes    Pressure Injury (Stage 3,4, Unstageable, DTI, NWPT, and Complex wounds) if present, place Wound referral order by RN under : Yes    New Ostomies, if present place, Ostomy referral order under : Yes     Nurse 1 eSignature: Electronically signed by Kaela Chu RN on 11/23/24 at 12:47 AM EST    **SHARE this note so that the co-signing nurse can place an eSignature**    Nurse 2 eSignature: Electronically signed by Dionna Sargent RN on 11/23/24 at 12:50 AM EST

## 2024-11-23 NOTE — PROGRESS NOTES
Continuous PRN          VENT SETTINGS (Comprehensive) (if applicable):     Additional Respiratory Assessments  Pulse: 62  Respirations: 15  SpO2: 96 %    Arterial Blood Gas 11/23/2024  No results for input(s): \"PH\", \"PCO2\", \"PO2\", \"HCO3\", \"BE\", \"O2SAT\" in the last 72 hours.      Laboratory findings:    Complete Blood Count:   Recent Labs     11/23/24  0430   WBC 19.1*   HGB 8.5*   HCT 29.1*           Last 3 Blood Glucose:   Recent Labs     11/22/24  1331 11/23/24  0430   GLUCOSE 81 117*        PT/INR:    Lab Results   Component Value Date/Time    PROTIME 15.1 11/23/2024 04:30 AM    INR 1.4 11/23/2024 04:30 AM     PTT:    Lab Results   Component Value Date/Time    APTT 33.0 10/25/2024 06:34 AM       Comprehensive Metabolic Profile:   Recent Labs     11/22/24  1331 11/23/24  0430    139   K 4.3 4.1    109*   CO2 25 22   BUN 29* 32*   CREATININE 1.53* 1.8*   GLUCOSE 81 117*   CALCIUM 8.7 8.1*   BILITOT 0.4 0.3   ALKPHOS 70 82   AST 9* 7   ALT <5* <5      Magnesium:   Lab Results   Component Value Date/Time    MG 1.6 11/23/2024 04:30 AM     Phosphorus:   Lab Results   Component Value Date/Time    PHOS 3.9 11/23/2024 04:30 AM     Ionized Calcium: No results found for: \"CAION\"     Urinalysis:   Lab Results   Component Value Date/Time    COLORU YELLOW 11/22/2024 01:59 PM    NITRU Negative 09/05/2017 11:50 AM    GLUCOSEU NEGATIVE 11/22/2024 01:59 PM    KETUA NEGATIVE 11/22/2024 01:59 PM    UROBILINOGEN NEGATIVE 11/22/2024 01:59 PM    BILIRUBINUR NEGATIVE 11/22/2024 01:59 PM        Troponin: No results for input(s): \"TROPONINI\" in the last 72 hours.    Microbiology:  Cultures drawn: Blood pending and Urine pending    Radiology/Imaging:     Chest Xray (11/23/2024):  Xray Result (most recent):  XR CHEST PORTABLE 11/23/2024    Narrative  EXAMINATION:  ONE XRAY VIEW OF THE CHEST    11/23/2024 5:58 am    COMPARISON:  None.    HISTORY:  ORDERING SYSTEM PROVIDED HISTORY: Evaluate left pleural  effusion`  TECHNOLOGIST PROVIDED HISTORY:  Reason for exam:->Evaluate left pleural effusion`    FINDINGS:  Portable chest reveal complete opacification seen of the left hemithorax.  Deep line catheter on the right tip in the SVC.  The right lung is clear.  Degenerative changes seen within the spine.  Opacification throughout the  left hemithorax may be mixture of collapsed lung and pleural fluid.  Consider  CT scan of the chest for further evaluation if clinically indicated.    Impression  Complete opacification seen of the left hemithorax which may be mixture of  collapsed lung and pleural fluid. Consider CT scan of the chest for further  evaluation if clinically indicated       ASSESSMENT:     Patient Active Problem List    Diagnosis Date Noted    Sepsis (HCC) 11/22/2024    Pleural effusion 11/22/2024    Acute cystitis without hematuria 11/22/2024    Colocutaneous fistula     Sacral insufficiency fracture 12/01/2011    Insufficiency fracture of pelvis 12/01/2011         PLAN:     Neuro  Dementia  Continue home meds    Respiratory  Acute hypoxic respiratory failure   2/2 large left pleural effusion, no CT evidence of opacities or infiltrates rapid COVID and influenza negative  Wean oxygen as tolerated. Keep O2 sat 90-92%  Room air currently    Large left pleural effusion  Very large left pleural effusion encompassing the entire left hemothorax with volume loss of the underlying left lung  Not new was noted back in October and seen by oncology at [facility] who recommended in the absence of a primary cancer to see primary care provider upon discharge for possible PET scan  Images on disc from outside facility unable to be reviewed will repeat CXR in the morning  Would likely benefit from diagnostic and therapeutic thoracentesis check INR    Cardiovascular  Shock   2/2 Septic shock and likely hypovolemia  EKG and troponin at outside facility unremarkable, repeat troponin and EKG  Check cortisol level, start stress dose

## 2024-11-24 ENCOUNTER — APPOINTMENT (OUTPATIENT)
Dept: MRI IMAGING | Age: 89
DRG: 871 | End: 2024-11-24
Attending: INTERNAL MEDICINE
Payer: MEDICARE

## 2024-11-24 LAB
ALBUMIN SERPL-MCNC: 1.9 G/DL (ref 3.5–5.2)
ALP SERPL-CCNC: 74 U/L (ref 35–104)
ALT SERPL-CCNC: <5 U/L (ref 0–32)
ANION GAP SERPL CALCULATED.3IONS-SCNC: 8 MMOL/L (ref 7–16)
AST SERPL-CCNC: 7 U/L (ref 0–31)
BASOPHILS # BLD: 0.01 K/UL (ref 0–0.2)
BASOPHILS NFR BLD: 0 % (ref 0–2)
BILIRUB SERPL-MCNC: 0.2 MG/DL (ref 0–1.2)
BUN SERPL-MCNC: 31 MG/DL (ref 6–23)
CALCIUM SERPL-MCNC: 7.7 MG/DL (ref 8.6–10.2)
CHLORIDE SERPL-SCNC: 111 MMOL/L (ref 98–107)
CO2 SERPL-SCNC: 20 MMOL/L (ref 22–29)
CREAT SERPL-MCNC: 1.6 MG/DL (ref 0.5–1)
EOSINOPHIL # BLD: 0 K/UL (ref 0.05–0.5)
EOSINOPHILS RELATIVE PERCENT: 0 % (ref 0–6)
ERYTHROCYTE [DISTWIDTH] IN BLOOD BY AUTOMATED COUNT: 18.5 % (ref 11.5–15)
GFR, ESTIMATED: 31 ML/MIN/1.73M2
GLUCOSE SERPL-MCNC: 133 MG/DL (ref 74–99)
HCT VFR BLD AUTO: 25.9 % (ref 34–48)
HGB BLD-MCNC: 7.7 G/DL (ref 11.5–15.5)
IMM GRANULOCYTES # BLD AUTO: 0.12 K/UL (ref 0–0.58)
IMM GRANULOCYTES NFR BLD: 1 % (ref 0–5)
LACTATE BLDV-SCNC: 1.8 MMOL/L (ref 0.5–2.2)
LACTATE BLDV-SCNC: 2.3 MMOL/L (ref 0.5–2.2)
LYMPHOCYTES NFR BLD: 1.23 K/UL (ref 1.5–4)
LYMPHOCYTES RELATIVE PERCENT: 10 % (ref 20–42)
MAGNESIUM SERPL-MCNC: 2.1 MG/DL (ref 1.6–2.6)
MCH RBC QN AUTO: 25.8 PG (ref 26–35)
MCHC RBC AUTO-ENTMCNC: 29.7 G/DL (ref 32–34.5)
MCV RBC AUTO: 86.6 FL (ref 80–99.9)
MICROORGANISM SPEC CULT: NORMAL
MONOCYTES NFR BLD: 0.28 K/UL (ref 0.1–0.95)
MONOCYTES NFR BLD: 2 % (ref 2–12)
NEUTROPHILS NFR BLD: 87 % (ref 43–80)
NEUTS SEG NFR BLD: 10.81 K/UL (ref 1.8–7.3)
PHOSPHATE SERPL-MCNC: 3.1 MG/DL (ref 2.5–4.5)
PLATELET # BLD AUTO: 228 K/UL (ref 130–450)
PMV BLD AUTO: 8.5 FL (ref 7–12)
POTASSIUM SERPL-SCNC: 3.6 MMOL/L (ref 3.5–5)
PROT SERPL-MCNC: 4.8 G/DL (ref 6.4–8.3)
RBC # BLD AUTO: 2.99 M/UL (ref 3.5–5.5)
SERVICE CMNT-IMP: NORMAL
SODIUM SERPL-SCNC: 139 MMOL/L (ref 132–146)
SPECIMEN DESCRIPTION: NORMAL
WBC OTHER # BLD: 12.5 K/UL (ref 4.5–11.5)

## 2024-11-24 PROCEDURE — 36592 COLLECT BLOOD FROM PICC: CPT

## 2024-11-24 PROCEDURE — 93005 ELECTROCARDIOGRAM TRACING: CPT

## 2024-11-24 PROCEDURE — 6370000000 HC RX 637 (ALT 250 FOR IP)

## 2024-11-24 PROCEDURE — 72195 MRI PELVIS W/O DYE: CPT

## 2024-11-24 PROCEDURE — 80053 COMPREHEN METABOLIC PANEL: CPT

## 2024-11-24 PROCEDURE — 85025 COMPLETE CBC W/AUTO DIFF WBC: CPT

## 2024-11-24 PROCEDURE — 83605 ASSAY OF LACTIC ACID: CPT

## 2024-11-24 PROCEDURE — 2060000000 HC ICU INTERMEDIATE R&B

## 2024-11-24 PROCEDURE — 6360000002 HC RX W HCPCS

## 2024-11-24 PROCEDURE — 51702 INSERT TEMP BLADDER CATH: CPT

## 2024-11-24 PROCEDURE — 83735 ASSAY OF MAGNESIUM: CPT

## 2024-11-24 PROCEDURE — 84100 ASSAY OF PHOSPHORUS: CPT

## 2024-11-24 PROCEDURE — 2580000003 HC RX 258

## 2024-11-24 RX ORDER — PIPERACILLIN SODIUM, TAZOBACTAM SODIUM 3; .375 G/15ML; G/15ML
3375 INJECTION, POWDER, LYOPHILIZED, FOR SOLUTION INTRAVENOUS EVERY 8 HOURS
Status: DISCONTINUED | OUTPATIENT
Start: 2024-11-24 | End: 2024-11-25 | Stop reason: DRUGHIGH

## 2024-11-24 RX ORDER — SODIUM CHLORIDE 9 MG/ML
10 INJECTION, SOLUTION INTRAMUSCULAR; INTRAVENOUS; SUBCUTANEOUS EVERY 8 HOURS
Status: DISCONTINUED | OUTPATIENT
Start: 2024-11-24 | End: 2024-11-25 | Stop reason: DRUGHIGH

## 2024-11-24 RX ADMIN — SODIUM CHLORIDE: 9 INJECTION, SOLUTION INTRAVENOUS at 07:36

## 2024-11-24 RX ADMIN — ENOXAPARIN SODIUM 30 MG: 100 INJECTION SUBCUTANEOUS at 09:59

## 2024-11-24 RX ADMIN — PANTOPRAZOLE SODIUM 40 MG: 40 TABLET, DELAYED RELEASE ORAL at 05:59

## 2024-11-24 RX ADMIN — PIPERACILLIN AND TAZOBACTAM 3375 MG: 3; .375 INJECTION, POWDER, LYOPHILIZED, FOR SOLUTION INTRAVENOUS at 04:05

## 2024-11-24 RX ADMIN — HYDROCORTISONE SODIUM SUCCINATE 100 MG: 100 INJECTION INTRAMUSCULAR; INTRAVENOUS at 15:20

## 2024-11-24 RX ADMIN — HYDROCORTISONE SODIUM SUCCINATE 100 MG: 100 INJECTION INTRAMUSCULAR; INTRAVENOUS at 09:59

## 2024-11-24 RX ADMIN — SODIUM CHLORIDE, PRESERVATIVE FREE 10 ML: 5 INJECTION INTRAVENOUS at 09:59

## 2024-11-24 RX ADMIN — PIPERACILLIN AND TAZOBACTAM 3375 MG: 3; .375 INJECTION, POWDER, LYOPHILIZED, FOR SOLUTION INTRAVENOUS at 15:22

## 2024-11-24 ASSESSMENT — PAIN SCALES - PAIN ASSESSMENT IN ADVANCED DEMENTIA (PAINAD)
BREATHING: NORMAL
CONSOLABILITY: NO NEED TO CONSOLE
FACIALEXPRESSION: SMILING OR INEXPRESSIVE
FACIALEXPRESSION: SMILING OR INEXPRESSIVE
BODYLANGUAGE: RELAXED
TOTALSCORE: 0
BREATHING: NORMAL
CONSOLABILITY: NO NEED TO CONSOLE
BODYLANGUAGE: RELAXED
BODYLANGUAGE: RELAXED
FACIALEXPRESSION: SMILING OR INEXPRESSIVE
TOTALSCORE: 0
BODYLANGUAGE: RELAXED
BODYLANGUAGE: RELAXED
FACIALEXPRESSION: SMILING OR INEXPRESSIVE
TOTALSCORE: 0
BODYLANGUAGE: RELAXED
BREATHING: NORMAL
CONSOLABILITY: NO NEED TO CONSOLE
CONSOLABILITY: NO NEED TO CONSOLE
BREATHING: NORMAL
BREATHING: NORMAL
FACIALEXPRESSION: SMILING OR INEXPRESSIVE
CONSOLABILITY: NO NEED TO CONSOLE
TOTALSCORE: 0
BREATHING: NORMAL
FACIALEXPRESSION: SMILING OR INEXPRESSIVE
CONSOLABILITY: NO NEED TO CONSOLE

## 2024-11-24 NOTE — PROGRESS NOTES
Patient transferring from ICU room 207 to 411, report called to Fox, placed on telepack 411, patient belongings consisting of shirt, pants. transported with patient.

## 2024-11-24 NOTE — PROGRESS NOTES
Critical Care Team - Daily Progress Note      Date and time: 11/24/2024 9:08 AM  Patient's name:  Estefany Levine  Medical Record Number: [id number]  Patient's account/billing number: [id number]  Patient's YOB: 1935  Age: 89 y.o.  Date of Admission: 11/22/2024 10:25 PM  Length of stay during current admission: 2      Primary Care Physician: Yaw Pak DO  ICU Attending Physician: Dr. Lee    Code Status: Full Code    Reason for ICU admission: Septic shock      SUBJECTIVE:     OVERNIGHT EVENTS:       Patient resting comfortably in bed. Down to 2mcg/min levophed at this time. Yesterday performed thoracentesis and CXR appears improved with ex vacuole noted from removal of fluid. Patient had episode of Bradycardia during the night, and has had an increase in lactic acid. Fluid showed exudative effusion based on lights criteria on protein, believed by attending to be infiltrative.       Intake/Output:   No intake/output data recorded.  I/O last 3 completed shifts:  In: 5601.4 [P.O.:380; I.V.:3003.1; IV Piggyback:2218.3]  Out: 4075 [Urine:1700; Other:2100; Stool:275]    Awake and following commands: Yes  Current Ventilation: - No ventilator support  Secretions: Minimal  Sedation: none  Paralyzed: No  Vasopressors: norepinephrine    ROS:  Unless stated above, ROS is otherwise negative.      Initial HPI + past overnight events: The patient is a 89 y.o. female with significant past medical history of diabetes, CKD, HLD, HTN, OA, osteoporosis, uterine cancer, dementia, patient presented to outside emergency department ([facility]) from [facility] for evaluation of hypoxia, fatigue, and nausea and vomiting.  Per EMS report reported fever of 99, heart rate in the 110s, systolic blood pressure in the 80s.  She was placed on 10 L NRB and satting 100%.  EKG at outside facility reveals sinus tachycardia with rate of 104, occasional ectopy and no acute ST elevation or depression.  Labs at outside  that required frequent physician assessment. I devoted my full attention to the direct care of this patient for the period of time indicated below. In addition to above, time was devoted to teaching and to any procedure.     NOTE: This report, in part or full, may have been transcribed using voice recognition software. Every effort was made to ensure accuracy; however, inadvertent computerized transcription errors may be present. Please excuse any transcriptional grammatical or spelling errors that may have escaped my editorial review.    Total critical care time caring for this patient with life threatening, unstable organ failure, including direct patient contact, management of life support systems, review of data including imaging and labs, discussions with other team members and physicians is at least 37 Min so far today, excluding procedures.    Mike Lee MD 5:03 PM 11/24/2024

## 2024-11-24 NOTE — PROGRESS NOTES
Infectious Disease  Progress Note  [facility]    Chief Complaint: sepsis    Subjective:  she is feeling cold other wise ok. No fever. Tolerating antibiotics well. Pt is in ICU.    Scheduled Meds:   sodium chloride flush  5-40 mL IntraVENous 2 times per day    enoxaparin  30 mg SubCUTAneous Daily    piperacillin-tazobactam  3,375 mg IntraVENous Q8H    pantoprazole  40 mg Oral QAM AC    hydrocortisone sodium succinate PF  100 mg IntraVENous Q8H     Continuous Infusions:   norepinephrine Stopped (11/24/24 0956)    sodium chloride      dextrose       PRN Meds:sodium chloride flush, sodium chloride, potassium chloride **OR** potassium alternative oral replacement **OR** [DISCONTINUED] potassium chloride, magnesium sulfate, ondansetron **OR** ondansetron, polyethylene glycol, acetaminophen **OR** acetaminophen, glucose, dextrose bolus **OR** dextrose bolus, glucagon (rDNA), dextrose    Prior to Admission medications    Medication Sig Start Date End Date Taking? Authorizing Provider   amLODIPine (NORVASC) 2.5 MG tablet Take 1 tablet by mouth daily Hold if SBP < 120   Yes Delfino Gonzalez MD   sodium chloride 0.9 % SOLN 100 mL with ferric gluconate 12.5 MG/ML SOLN 125 mg Infuse 125 mg intravenously every 7 days THURSDAYS 11/21/24 12/26/24 Yes Delfino Gonzalez MD   gabapentin (NEURONTIN) 100 MG capsule Take 1 capsule by mouth 2 times daily.   Yes Delfino Gonzalez MD   ondansetron (ZOFRAN-ODT) 4 MG disintegrating tablet Take 1 tablet by mouth every 8 hours as needed for Nausea or Vomiting   Yes Delfino Gonzalez MD   sodium bicarbonate 650 MG tablet Take 1 tablet by mouth 3 times daily   Yes Delfino Gonzalez MD   donepezil (ARICEPT) 10 MG tablet Take 1 tablet by mouth nightly    Delfino Gonzalez MD   memantine (NAMENDA) 5 MG tablet Take 1 tablet by mouth 2 times daily    Delfino Gonzalez MD   Multiple Vitamin (MULTIVITAMIN ADULT PO) Take by mouth    Delfino Gonzalez MD   valsartan (DIOVAN)

## 2024-11-24 NOTE — PROGRESS NOTES
GENERAL SURGERY  DAILY PROGRESS NOTE  11/24/2024    No chief complaint on file.      ATTENDING PHYSICIAN PROGRESS NOTE      I have examined the patient and performed the key aspects of physical exam.  I have independently reviewed the record including all pertinent and new radiology images and laboratory findings as well as reviewed all pertinent provider documentation), and discussed the case with the surgical team.  I agree with the assessment and plan with the following additions, corrections, and changes. 14pt review of symptoms completed and negative except as mentioned.      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     Ostomy bag removed and wound inspected; no evidence of any infection at this area  Exposed mesh partially excised back to skin level  New appliance placed by bedside RN  No plans for acute surgical intervention  Rec f/u w [facility] as outlined in previous notes from her last visit there for this chronic colocutaneous fistula    JIMBO Obrien MD [facility]  Minimally Invasive General Surgery and Endoscopy  [facility] General [facility]  [address] [place] [address], [address]  [place] [address]  O: [phone]  F: [phone]      Electronically signed by Tony Obrien MD on 11/24/2024 at 10:17 AM

## 2024-11-24 NOTE — PATIENT CARE CONFERENCE
ICU Team Members: Dr. Lee, Resident Dr. Kerr, Bedside nurse, Charge nurse, Respiratory therapist      ICU Day #: NUMBER: 3     SOFA Score: 3     Intubation Date:  N/A     Ventilator Day #: N/A     Central Line Insertion Date: November 22                                                    Day #: NUMBER: 3                                                    Indication: CVCIndication: Hemodynamically unstable requiring volume resuscitation      Arterial Line Insertion Date: November 22                             Day #:    no arterial line in as of 11/24     Temporary Hemodialysis Catheter Insertion Date:  N/A                             Day # N/A     DVT Prophylaxis: Lovenox    GI Prophylaxis: Protonix     Montanez Catheter Insertion Date: November 22                                        Day #: 3                             Indications: Need for fluid management of the critically ill patient in a critical care setting                             Continued need (if yes, reason documented and discussed with physician): Yes, pressor     Skin Issues/ Wounds and ordered treatment discussed on rounds: Yes, wound     Goals/ Plans for the Day: Monitor labs and vitals, treat/replace as needed.  Wean pressors to keep MAP greater than 60, check NICOM.  Continue Zosyn. Order ECHO and EKG. Order lactic     Reviewed plan and goals for day with patient and/or representative: No, patient confused.

## 2024-11-24 NOTE — PROGRESS NOTES
Internal Medicine   Progress Note    Admit Date: 11/22/2024  Hospital day:    Hospital Day: 3  SUBJECTIVE:  89-year-old lady who was admitted with fever nausea and vomiting and with a diagnosis of sepsis.  She was hypertensive.  Her blood pressure still is a little low.  He has history of frequent UTI and cystitis.  She is a poor historian  No new acute problems overnight.  She states she is doing okay.  No chest pain or shortness of breath.  No nausea or vomiting.  No fever.  No abdominal pain.  OBJECTIVE:     BP (!) 81/51   Pulse 57   Temp 99.2 °F (37.3 °C) (Oral)   Resp 16   Wt 56.9 kg (125 lb 7.1 oz)   SpO2 97%   BMI 23.32 kg/m²   Patient Vitals for the past 24 hrs:   BP Temp Temp src Pulse Resp SpO2 Weight   11/24/24 1000 (!) 81/51 -- -- 57 16 -- --   11/24/24 0900 (!) 84/62 99.2 °F (37.3 °C) Oral -- 20 97 % --   11/24/24 0800 (!) 103/52 -- -- 58 18 -- --   11/24/24 0700 (!) 97/49 -- -- 52 17 -- --   11/24/24 0630 (!) 93/41 -- -- 50 16 -- --   11/24/24 0612 -- -- -- 59 18 -- --   11/24/24 0600 (!) 84/51 -- -- 73 13 -- --   11/24/24 0500 (!) 95/51 -- -- (!) 49 17 -- --   11/24/24 0400 (!) 84/38 97.6 °F (36.4 °C) Oral 73 20 -- --   11/24/24 0300 (!) 86/45 -- -- 63 20 92 % --   11/24/24 0200 (!) 88/49 -- -- 64 19 -- --   11/24/24 0130 (!) 101/52 -- -- 74 22 -- --   11/24/24 0100 (!) 94/55 -- -- 66 20 -- --   11/24/24 0033 -- -- -- 57 -- -- --   11/24/24 0030 (!) 100/53 -- -- 69 19 -- --   11/24/24 0000 90/73 97.5 °F (36.4 °C) -- 59 20 -- 56.9 kg (125 lb 7.1 oz)   11/23/24 2300 (!) 95/53 -- -- 58 18 91 % --   11/23/24 2200 (!) 94/57 -- -- 58 19 92 % --   11/23/24 2100 (!) 96/52 -- -- 67 21 -- --   11/23/24 2028 -- -- -- 59 -- -- --   11/23/24 2000 (!) 92/51 97.6 °F (36.4 °C) Oral 61 20 96 % --   11/23/24 1900 (!) 91/51 -- -- 65 19 -- --   11/23/24 1800 (!) 98/50 -- -- 88 19 -- --   11/23/24 1700 (!) 73/43 -- -- 62 18 -- --   11/23/24 1600 (!) 86/49 -- -- 70 21 -- --   11/23/24 1500 (!) 92/53 -- -- 60 18 -- --

## 2024-11-25 ENCOUNTER — APPOINTMENT (OUTPATIENT)
Age: 89
DRG: 871 | End: 2024-11-25
Attending: INTERNAL MEDICINE
Payer: MEDICARE

## 2024-11-25 PROBLEM — E43 SEVERE PROTEIN-CALORIE MALNUTRITION (HCC): Status: ACTIVE | Noted: 2024-11-25

## 2024-11-25 LAB
ALBUMIN SERPL-MCNC: 1.8 G/DL (ref 3.5–5.2)
ALP SERPL-CCNC: 68 U/L (ref 35–104)
ALT SERPL-CCNC: 6 U/L (ref 0–32)
ANION GAP SERPL CALCULATED.3IONS-SCNC: 8 MMOL/L (ref 7–16)
AST SERPL-CCNC: 13 U/L (ref 0–31)
BASOPHILS # BLD: 0 K/UL (ref 0–0.2)
BASOPHILS NFR BLD: 0 % (ref 0–2)
BILIRUB SERPL-MCNC: 0.2 MG/DL (ref 0–1.2)
BUN SERPL-MCNC: 28 MG/DL (ref 6–23)
CALCIUM SERPL-MCNC: 8.1 MG/DL (ref 8.6–10.2)
CHLORIDE SERPL-SCNC: 112 MMOL/L (ref 98–107)
CO2 SERPL-SCNC: 18 MMOL/L (ref 22–29)
CREAT SERPL-MCNC: 1.5 MG/DL (ref 0.5–1)
ECHO AO ASC DIAM: 2.9 CM
ECHO AO ASCENDING AORTA INDEX: 1.93 CM/M2
ECHO AV AREA PEAK VELOCITY: 3 CM2
ECHO AV AREA VTI: 3 CM2
ECHO AV AREA/BSA PEAK VELOCITY: 2 CM2/M2
ECHO AV AREA/BSA VTI: 2 CM2/M2
ECHO AV CUSP MM: 1.9 CM
ECHO AV MEAN GRADIENT: 3 MMHG
ECHO AV MEAN VELOCITY: 0.8 M/S
ECHO AV PEAK GRADIENT: 5 MMHG
ECHO AV PEAK VELOCITY: 1.2 M/S
ECHO AV VELOCITY RATIO: 0.83
ECHO AV VTI: 23.5 CM
ECHO BSA: 1.52 M2
ECHO EST RA PRESSURE: 3 MMHG
ECHO LA DIAMETER INDEX: 2.47 CM/M2
ECHO LA DIAMETER: 3.7 CM
ECHO LA VOL A-L A2C: 41 ML (ref 22–52)
ECHO LA VOL A-L A4C: 64 ML (ref 22–52)
ECHO LA VOL MOD A2C: 39 ML (ref 22–52)
ECHO LA VOL MOD A4C: 57 ML (ref 22–52)
ECHO LA VOLUME AREA LENGTH: 52 ML
ECHO LA VOLUME INDEX A-L A2C: 27 ML/M2 (ref 16–34)
ECHO LA VOLUME INDEX A-L A4C: 43 ML/M2 (ref 16–34)
ECHO LA VOLUME INDEX AREA LENGTH: 35 ML/M2 (ref 16–34)
ECHO LA VOLUME INDEX MOD A2C: 26 ML/M2 (ref 16–34)
ECHO LA VOLUME INDEX MOD A4C: 38 ML/M2 (ref 16–34)
ECHO LV E' LATERAL VELOCITY: 10 CM/S
ECHO LV E' SEPTAL VELOCITY: 11 CM/S
ECHO LV EF PHYSICIAN: 65 %
ECHO LV FRACTIONAL SHORTENING: 34 % (ref 28–44)
ECHO LV INTERNAL DIMENSION DIASTOLE INDEX: 2.33 CM/M2
ECHO LV INTERNAL DIMENSION DIASTOLIC: 3.5 CM (ref 3.9–5.3)
ECHO LV INTERNAL DIMENSION SYSTOLIC INDEX: 1.53 CM/M2
ECHO LV INTERNAL DIMENSION SYSTOLIC: 2.3 CM
ECHO LV ISOVOLUMETRIC RELAXATION TIME (IVRT): 106.1 MS
ECHO LV IVSD: 1 CM (ref 0.6–0.9)
ECHO LV IVSS: 1.1 CM
ECHO LV MASS 2D: 95.9 G (ref 67–162)
ECHO LV MASS INDEX 2D: 64 G/M2 (ref 43–95)
ECHO LV POSTERIOR WALL DIASTOLIC: 0.9 CM (ref 0.6–0.9)
ECHO LV POSTERIOR WALL SYSTOLIC: 1 CM
ECHO LV RELATIVE WALL THICKNESS RATIO: 0.51
ECHO LVOT AREA: 3.5 CM2
ECHO LVOT AV VTI INDEX: 0.87
ECHO LVOT DIAM: 2.1 CM
ECHO LVOT MEAN GRADIENT: 2 MMHG
ECHO LVOT PEAK GRADIENT: 4 MMHG
ECHO LVOT PEAK VELOCITY: 1 M/S
ECHO LVOT STROKE VOLUME INDEX: 47.3 ML/M2
ECHO LVOT SV: 71 ML
ECHO LVOT VTI: 20.5 CM
ECHO MV "A" WAVE DURATION: 166.1 MSEC
ECHO MV A VELOCITY: 0.65 M/S
ECHO MV AREA PHT: 2.4 CM2
ECHO MV AREA VTI: 2.3 CM2
ECHO MV E DECELERATION TIME (DT): 279.5 MS
ECHO MV E VELOCITY: 0.66 M/S
ECHO MV E/A RATIO: 1.02
ECHO MV E/E' LATERAL: 6.6
ECHO MV E/E' RATIO (AVERAGED): 6.3
ECHO MV E/E' SEPTAL: 6
ECHO MV LVOT VTI INDEX: 1.51
ECHO MV MAX VELOCITY: 0.9 M/S
ECHO MV MEAN GRADIENT: 1 MMHG
ECHO MV MEAN VELOCITY: 0.5 M/S
ECHO MV PEAK GRADIENT: 3 MMHG
ECHO MV PRESSURE HALF TIME (PHT): 90.8 MS
ECHO MV VTI: 31 CM
ECHO PV MAX VELOCITY: 1 M/S
ECHO PV MEAN GRADIENT: 2 MMHG
ECHO PV MEAN VELOCITY: 0.7 M/S
ECHO PV PEAK GRADIENT: 4 MMHG
ECHO PV VTI: 22.8 CM
ECHO RIGHT VENTRICULAR SYSTOLIC PRESSURE (RVSP): 28 MMHG
ECHO RV TAPSE: 2.8 CM (ref 1.7–?)
ECHO TV REGURGITANT MAX VELOCITY: 2.49 M/S
ECHO TV REGURGITANT PEAK GRADIENT: 25 MMHG
EKG ATRIAL RATE: 33 BPM
EKG ATRIAL RATE: 72 BPM
EKG P AXIS: 38 DEGREES
EKG P-R INTERVAL: 182 MS
EKG Q-T INTERVAL: 360 MS
EKG Q-T INTERVAL: 404 MS
EKG QRS DURATION: 72 MS
EKG QRS DURATION: 76 MS
EKG QTC CALCULATION (BAZETT): 389 MS
EKG QTC CALCULATION (BAZETT): 394 MS
EKG R AXIS: 36 DEGREES
EKG R AXIS: 46 DEGREES
EKG T AXIS: 4 DEGREES
EKG T AXIS: 52 DEGREES
EKG VENTRICULAR RATE: 56 BPM
EKG VENTRICULAR RATE: 72 BPM
EOSINOPHIL # BLD: 0 K/UL (ref 0.05–0.5)
EOSINOPHILS RELATIVE PERCENT: 0 % (ref 0–6)
ERYTHROCYTE [DISTWIDTH] IN BLOOD BY AUTOMATED COUNT: 18.5 % (ref 11.5–15)
GFR, ESTIMATED: 34 ML/MIN/1.73M2
GLUCOSE SERPL-MCNC: 92 MG/DL (ref 74–99)
HCT VFR BLD AUTO: 27.2 % (ref 34–48)
HGB BLD-MCNC: 7.4 G/DL (ref 11.5–15.5)
LYMPHOCYTES NFR BLD: 1.27 K/UL (ref 1.5–4)
LYMPHOCYTES RELATIVE PERCENT: 19 % (ref 20–42)
MAGNESIUM SERPL-MCNC: 1.9 MG/DL (ref 1.6–2.6)
MCH RBC QN AUTO: 25.8 PG (ref 26–35)
MCHC RBC AUTO-ENTMCNC: 27.2 G/DL (ref 32–34.5)
MCV RBC AUTO: 94.8 FL (ref 80–99.9)
MONOCYTES NFR BLD: 0 % (ref 2–12)
MONOCYTES NFR BLD: 0 K/UL (ref 0.1–0.95)
NEUTROPHILS NFR BLD: 81 % (ref 43–80)
NEUTS SEG NFR BLD: 5.43 K/UL (ref 1.8–7.3)
PHOSPHATE SERPL-MCNC: 3 MG/DL (ref 2.5–4.5)
PLATELET # BLD AUTO: 164 K/UL (ref 130–450)
PMV BLD AUTO: 9.7 FL (ref 7–12)
POTASSIUM SERPL-SCNC: 3.4 MMOL/L (ref 3.5–5)
PROT SERPL-MCNC: 4.6 G/DL (ref 6.4–8.3)
RBC # BLD AUTO: 2.87 M/UL (ref 3.5–5.5)
RBC # BLD: ABNORMAL 10*6/UL
SODIUM SERPL-SCNC: 138 MMOL/L (ref 132–146)
WBC OTHER # BLD: 6.7 K/UL (ref 4.5–11.5)

## 2024-11-25 PROCEDURE — 6370000000 HC RX 637 (ALT 250 FOR IP)

## 2024-11-25 PROCEDURE — C8929 TTE W OR WO FOL WCON,DOPPLER: HCPCS

## 2024-11-25 PROCEDURE — 2060000000 HC ICU INTERMEDIATE R&B

## 2024-11-25 PROCEDURE — 83735 ASSAY OF MAGNESIUM: CPT

## 2024-11-25 PROCEDURE — 36415 COLL VENOUS BLD VENIPUNCTURE: CPT

## 2024-11-25 PROCEDURE — 6360000004 HC RX CONTRAST MEDICATION

## 2024-11-25 PROCEDURE — 2580000003 HC RX 258

## 2024-11-25 PROCEDURE — 85025 COMPLETE CBC W/AUTO DIFF WBC: CPT

## 2024-11-25 PROCEDURE — 93306 TTE W/DOPPLER COMPLETE: CPT | Performed by: INTERNAL MEDICINE

## 2024-11-25 PROCEDURE — 93010 ELECTROCARDIOGRAM REPORT: CPT | Performed by: INTERNAL MEDICINE

## 2024-11-25 PROCEDURE — 80053 COMPREHEN METABOLIC PANEL: CPT

## 2024-11-25 PROCEDURE — 84100 ASSAY OF PHOSPHORUS: CPT

## 2024-11-25 PROCEDURE — 6360000002 HC RX W HCPCS

## 2024-11-25 RX ORDER — SODIUM CHLORIDE 9 MG/ML
10 INJECTION, SOLUTION INTRAMUSCULAR; INTRAVENOUS; SUBCUTANEOUS EVERY 12 HOURS
Status: DISPENSED | OUTPATIENT
Start: 2024-11-25 | End: 2024-11-30

## 2024-11-25 RX ORDER — PIPERACILLIN SODIUM, TAZOBACTAM SODIUM 3; .375 G/15ML; G/15ML
3375 INJECTION, POWDER, LYOPHILIZED, FOR SOLUTION INTRAVENOUS EVERY 12 HOURS
Status: DISCONTINUED | OUTPATIENT
Start: 2024-11-25 | End: 2024-11-26

## 2024-11-25 RX ADMIN — HYDROCORTISONE SODIUM SUCCINATE 100 MG: 100 INJECTION INTRAMUSCULAR; INTRAVENOUS at 00:35

## 2024-11-25 RX ADMIN — PIPERACILLIN AND TAZOBACTAM 3375 MG: 3; .375 INJECTION, POWDER, LYOPHILIZED, FOR SOLUTION INTRAVENOUS at 00:36

## 2024-11-25 RX ADMIN — PERFLUTREN 1.5 ML: 6.52 INJECTION, SUSPENSION INTRAVENOUS at 11:29

## 2024-11-25 RX ADMIN — PIPERACILLIN AND TAZOBACTAM 3375 MG: 3; .375 INJECTION, POWDER, LYOPHILIZED, FOR SOLUTION INTRAVENOUS at 12:47

## 2024-11-25 RX ADMIN — HYDROCORTISONE SODIUM SUCCINATE 100 MG: 100 INJECTION INTRAMUSCULAR; INTRAVENOUS at 08:30

## 2024-11-25 RX ADMIN — SODIUM CHLORIDE, PRESERVATIVE FREE 10 ML: 5 INJECTION INTRAVENOUS at 08:33

## 2024-11-25 RX ADMIN — HYDROCORTISONE SODIUM SUCCINATE 100 MG: 100 INJECTION INTRAMUSCULAR; INTRAVENOUS at 18:27

## 2024-11-25 RX ADMIN — SODIUM CHLORIDE, PRESERVATIVE FREE 10 ML: 5 INJECTION INTRAVENOUS at 00:37

## 2024-11-25 RX ADMIN — ENOXAPARIN SODIUM 30 MG: 100 INJECTION SUBCUTANEOUS at 08:30

## 2024-11-25 RX ADMIN — SODIUM CHLORIDE 10 ML: 9 INJECTION INTRAMUSCULAR; INTRAVENOUS; SUBCUTANEOUS at 00:37

## 2024-11-25 RX ADMIN — SODIUM CHLORIDE, PRESERVATIVE FREE 10 ML: 5 INJECTION INTRAVENOUS at 12:47

## 2024-11-25 RX ADMIN — SODIUM CHLORIDE, PRESERVATIVE FREE 10 ML: 5 INJECTION INTRAVENOUS at 21:04

## 2024-11-25 RX ADMIN — POTASSIUM CHLORIDE 40 MEQ: 1500 TABLET, EXTENDED RELEASE ORAL at 08:40

## 2024-11-25 RX ADMIN — PANTOPRAZOLE SODIUM 40 MG: 40 TABLET, DELAYED RELEASE ORAL at 06:00

## 2024-11-25 NOTE — PROGRESS NOTES
Comprehensive Nutrition Assessment    Type and Reason for Visit:  Initial, Consult, Wound    Nutrition Recommendations/Plan:   Continue diet per orders.  Added wound ONS BID & frozen ONS BID to optimize nutrient intake & promote healing. Meets criteria for severe PCM. Will monitor.      Malnutrition Assessment:  Malnutrition Status:  Severe malnutrition (11/25/24 4462)    Context:  Chronic Illness     Findings of the 6 clinical characteristics of malnutrition:  Energy Intake:  Unable to assess (unable to quantify)  Weight Loss:  5% over 1 month     Body Fat Loss:  Severe body fat loss Triceps   Muscle Mass Loss:  Severe muscle mass loss Clavicles (pectoralis & deltoids), Temples (temporalis)  Fluid Accumulation:  No fluid accumulation     Strength:  Not Performed    Nutrition Assessment:    ADM w/ Sepsis. PMH: DM, CKD 3, Enterocutaneous Fistula/ostomy pouch ([facility]), R colectomy, gastric bypass, uterine cancer (XRT), dementia. Noted abd wound-per wound RN. Seen by GS-not a surgical candidate at this time.  Added wound ONS BID & frozen ONS BID to optimize nutrient intake & promote healing. Meets criteria for severe PCM. Will monitor.    Nutrition Related Findings:    Confused, N&V on adm, soft/round abd, + BS, no edema, + 1.5 I&O, mid abd fistula/ostomy, steroid, K+ 3.4, GFR/Cr 34/1.5 Wound Type: Pressure Injury, Stage II (per wound nurse)       Current Nutrition Intake & Therapies:    Average Meal Intake: Unable to assess     ADULT DIET; Regular; 5 carb choices (75 gm/meal)  ADULT ORAL NUTRITION SUPPLEMENT; Breakfast, Dinner; Wound Healing Oral Supplement  ADULT ORAL NUTRITION SUPPLEMENT; Lunch, Dinner; Frozen Oral Supplement    Anthropometric Measures:  Height: 152.4 cm (5')  Ideal Body Weight (IBW): 100 lbs (45 kg)    Admission Body Weight: 55.1 kg (121 lb 8 oz) (bed 11/22)  Current Body Weight: 60 kg (132 lb 4.4 oz), 132.3 % IBW. Weight Source: Bed scale  Current BMI (kg/m2): 25.8  Usual Body Weight: 64.7 kg

## 2024-11-25 NOTE — PROGRESS NOTES
4 Eyes Skin Assessment     NAME:  Estefany Levine  YOB: 1935  MEDICAL RECORD NUMBER:  [id number]    The patient is being assessed for  Admission    I agree that at least one RN has performed a thorough Head to Toe Skin Assessment on the patient. ALL assessment sites listed below have been assessed.      Areas assessed by both nurses:    Head, Face, Ears, Shoulders, Back, Chest, Arms, Elbows, Hands, Sacrum. Buttock, Coccyx, Ischium, Legs. Feet and Heels, and Under Medical Devices         Does the Patient have a Wound? Yes wound(s) were present on assessment. LDA wound assessment was Initiated and completed by RN       Scott Prevention initiated by RN: Yes  Wound Care Orders initiated by RN: Yes    Pressure Injury (Stage 3,4, Unstageable, DTI, NWPT, and Complex wounds) if present, place Wound referral order by RN under : Yes    New Ostomies, if present place, Ostomy referral order under : Yes     Nurse 1 eSignature: Electronically signed by DONAL YEUNG RN on 11/25/24 at 6:36 AM EST    **SHARE this note so that the co-signing nurse can place an eSignature**    Nurse 2 eSignature: Electronically signed by Araseli Tapia RN on 11/25/24 at 6:37 AM EST

## 2024-11-25 NOTE — PROGRESS NOTES
GENERAL SURGERY  DAILY PROGRESS NOTE  11/25/2024        Subjective:  Ostomy leaking a bit this morning. Pleasantly confused.     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

## 2024-11-25 NOTE — PROGRESS NOTES
Infectious Disease  Progress Note  [facility]    Chief Complaint: sepsis    Subjective: The patient was transferred out of the ICU.  No new complaints.  She remains afebrile.    Scheduled Meds:   piperacillin-tazobactam  3,375 mg IntraVENous Q12H    And    sodium chloride (PF)  10 mL IntraVENous Q12H    sodium chloride flush  5-40 mL IntraVENous 2 times per day    enoxaparin  30 mg SubCUTAneous Daily    pantoprazole  40 mg Oral QAM AC    hydrocortisone sodium succinate PF  100 mg IntraVENous Q8H     Continuous Infusions:   sodium chloride      dextrose       PRN Meds:perflutren lipid microspheres, sodium chloride flush, sodium chloride, potassium chloride **OR** potassium alternative oral replacement **OR** [DISCONTINUED] potassium chloride, magnesium sulfate, ondansetron **OR** ondansetron, polyethylene glycol, acetaminophen **OR** acetaminophen, glucose, dextrose bolus **OR** dextrose bolus, glucagon (rDNA), dextrose    Prior to Admission medications    Medication Sig Start Date End Date Taking? Authorizing Provider   amLODIPine (NORVASC) 2.5 MG tablet Take 1 tablet by mouth daily Hold if SBP < 120   Yes Delfino Gonzalez MD   sodium chloride 0.9 % SOLN 100 mL with ferric gluconate 12.5 MG/ML SOLN 125 mg Infuse 125 mg intravenously every 7 days THURSDAYS 11/21/24 12/26/24 Yes Delfino Gonzalez MD   gabapentin (NEURONTIN) 100 MG capsule Take 1 capsule by mouth 2 times daily.   Yes Delfino Gonzalez MD   ondansetron (ZOFRAN-ODT) 4 MG disintegrating tablet Take 1 tablet by mouth every 8 hours as needed for Nausea or Vomiting   Yes Delfino Gonzalez MD   sodium bicarbonate 650 MG tablet Take 1 tablet by mouth 3 times daily   Yes Delfino Gonzalez MD   donepezil (ARICEPT) 10 MG tablet Take 1 tablet by mouth nightly    Delfino Gonzalez MD   memantine (NAMENDA) 5 MG tablet Take 1 tablet by mouth 2 times daily    Delfino Gonzalez MD   Multiple Vitamin (MULTIVITAMIN ADULT PO) Take by mouth     Provider, MD Delfino   valsartan (DIOVAN) 80 MG tablet Take 0.5 tablets by mouth daily    Delfino Gonzalez MD   Cholecalciferol (VITAMIN D-3 PO) Take 1,000 Int'l Units by mouth every 48 hours Takes one every other day    Delfino Gonzalez MD   metFORMIN (GLUCOPHAGE) 500 MG tablet Take 1 tablet by mouth daily (with breakfast)    Delfino Gonzalez MD        ROS:  As mentioned in subjective, all other systems negative      BP (!) 102/56   Pulse 63   Temp 97.5 °F (36.4 °C) (Oral)   Resp 18   Ht 1.524 m (5')   Wt 54.4 kg (120 lb)   SpO2 100%   BMI 23.44 kg/m²     Physical Exam  Constitutional: The patient is lying in bed.  Skin: Warm and dry. Small wound sacral area.  HEENT: Eyes show round, and reactive pupils. Moist mucous membranes, no ulcerations, no thrush.   Neck: Supple to movements. No lymphadenopathy.   Chest: No use of accessory muscles to breathe. Symmetrical expansion. Auscultation reveals no breath sounds left side LL. Clear on the right side.  Cardiovascular: S1 and S2 are rhythmic and regular. No murmurs appreciated.   Abdomen: Soft, non tender. Open wound with colostomy - there is black mesh visible coming out of the open wound. Foul smelling.  Extremities: No clubbing, no cyanosis, no edema.  Musculoskeletal: no gross focal abnormalities  Neurological: awake alert  Lines: peripheral, right IJ CVC    Labs, Cultures reviewed    Radiology:  MRI confirmed osteomyelitis of the symphysis pubis and bilateral pubic rami with a fluid collection    Microbiology:  Blood culture 11/23: negative so far  MRSA NS: pending  Urine culture: 11/23: GNR     Assessment:  Recurrent sepsis episodes: likely translocation from pubic symphysis vs UTI  complicated UTI  Chronic pubic symphysis osteomyelitis: /colocutaneous fistula with mesh exposed it was trimmed by surgery yesterday.   Leucocytosis:   left pleural effusion: s/p thoracentesis. Looks transudative     Plan:    Continue IV Zosyn   Ct from [facility]

## 2024-11-25 NOTE — PROGRESS NOTES
Mj Walden M.D.,[facility]  Star Means D.O., F.ELHAM.REN.OEllieI., [facility]  Regine Hernandez M.D.  Jenn Collier M.D.   DUC MarshallO.        Daily Pulmonary Progress Note    Patient:  Estefany Levine 89 y.o. female MRN: [id number]     Date of Service: 11/25/2024      Synopsis     We are following patient for s/p left thoracentesis ,out of ICU    \"CC\" septic shock     Code status:Full       Subjective      Patient was seen and examined.  Resting in bed on RA, pox 100% , getting echo at bedside currently   Denies cough or dyspnea   Some confusion/forgetfulness     Review of Systems:  Constitutional: Denies fever, weight loss, night sweats, and fatigue  Skin: Denies pigmentation, dark lesions, and rashes   HEENT: Denies hearing loss, tinnitus, ear drainage, epistaxis, sore throat, and hoarseness.  Cardiovascular: Denies palpitations, chest pain, and chest pressure.  Respiratory: Denies cough, dyspnea at rest, hemoptysis, apnea, and choking.  Gastrointestinal: Denies nausea, vomiting, poor appetite, diarrhea, heartburn or reflux  Genitourinary: Denies dysuria, frequency, urgency or hematuria  Musculoskeletal: Denies myalgias, muscle weakness, and bone pain  Neurological: Denies dizziness, vertigo, headache, and focal weakness  Psychological: Denies anxiety and depression  Endocrine: Denies heat intolerance and cold intolerance  Hematopoietic/Lymphatic: Denies bleeding problems and blood transfusions    24-hour events:  Out of ICU    Objective   OBJECTIVE:   BP (!) 102/56   Pulse 63   Temp 97.5 °F (36.4 °C) (Oral)   Resp 18   Ht 1.524 m (5')   Wt 54.4 kg (120 lb)   SpO2 100%   BMI 23.44 kg/m²   SpO2 Readings from Last 1 Encounters:   11/25/24 100%        I/O:    Intake/Output Summary (Last 24 hours) at 11/25/2024 1127  Last data filed at 11/25/2024 1001  Gross per 24 hour   Intake --   Output 10 ml   Net -10 ml                      CURRENT MEDS :  Scheduled Meds:   piperacillin-tazobactam  3,375 mg

## 2024-11-25 NOTE — PROGRESS NOTES
Call out to Dr Ann about pt blood pressure. Per Toshia the NP, she states that the RN taking over her care can call her if her systolic blood pressure goes below 90. Rn resuming care was made aware.

## 2024-11-25 NOTE — DISCHARGE INSTR - COC
Continuity of Care Form    Patient Name: Estefany Levine   :  1935  MRN:  [id number]    Admit date:  2024  Discharge date:  2024    Code Status Order: Full Code   Advance Directives:   Advance Care Flowsheet Documentation             Admitting Physician:  Caity Ann MD  PCP: Yaw Pak DO    Discharging Nurse: JIMBO Waters   Discharging Hospital Unit/Room#: 0411/0411-A  Discharging Unit Phone Number: [phone]    Emergency Contact:   Extended Emergency Contact Information  Primary Emergency Contact: Lore Adan  Mobile Phone: [phone]  Relation: Child  Secondary Emergency Contact: Yaw Levine  Address: [address] [place] [address] [place]  Relation: Spouse    Past Surgical History:  Past Surgical History:   Procedure Laterality Date    ANKLE SURGERY      pinning    APPENDECTOMY      COLON SURGERY      COLONOSCOPY  2020    Dr. Najera    CT NEEDLE BIOPSY LUNG PERCUTANEOUS  3/24/2022    CT NEEDLE BIOPSY LUNG PERCUTANEOUS 3/24/2022    FOOT SURGERY Right     GASTRIC BYPASS SURGERY      HERNIA REPAIR      x2    HYSTERECTOMY (CERVIX STATUS UNKNOWN)  2001    SPINE SURGERY  2015    L4-L5 fusion  Dr Salvador     TONSILLECTOMY         Immunization History:   Immunization History   Administered Date(s) Administered    COVID-19, PFIZER GRAY top, DO NOT Dilute, (age 12 y+), IM, 30 mcg/0.3 mL 2022    COVID-19, PFIZER PURPLE top, DILUTE for use, (age 12 y+), 30mcg/0.3mL 2021, 2021, 10/14/2021    COVID-19, PFIZER, (age 12y+), IM, 30mcg/0.3mL 10/04/2024       Active Problems:  Patient Active Problem List   Diagnosis Code    Sacral insufficiency fracture M84.48XA    Insufficiency fracture of pelvis M84.454A    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

## 2024-11-25 NOTE — CARE COORDINATION
Introduced my self and provided explanation of CM role to patient.  Patient confused with poor recall.  Could not participate fully in dc planning.  Spoke with daughter Lore via phone.    Patient is long term care resident at [facility], shares room with .  Plan is return to [facility]. Spoke with Savana, facility intake liaison, who confirmed patient is eligible for bed hold days and can return to facility at time of discharge w/o authorization.  Patient will need followed and assisted with discharge planning as necessary.   Can Grant, MSN RN  [facility] Case Management  [phone]

## 2024-11-25 NOTE — PROGRESS NOTES
Antibiotic Extended Infusion Policy     This patient is on medication that requires renal, weight, and/or indication dose adjustment.      Date Body Weight IBW  Adjusted BW SCr  CrCl Dialysis status BMI   11/25/2024 54.4 kg (120 lb) Ideal body weight: 45.5 kg (100 lb 4.9 oz)  Adjusted ideal body weight: 49.1 kg (108 lb 3 oz) Serum creatinine: 1.6 mg/dL (H) 11/24/24 0400  Estimated creatinine clearance: 18 mL/min (A) N/a Body mass index is 23.44 kg/m².       Pharmacy has dose-adjusted the following medication(s):    Ordered Medication: Zosyn 3375mg q8H     Order Changed/converted to: Zosyn 3375mg q12H    These changes were made per protocol according to the [facility]   Automatic Extended Infusion Dose Adjustment Policy.     *Please note this dose may need readjusted if patient's condition changes.    Please contact pharmacy with any questions regarding these changes.    john singletary RPH  11/25/2024  6:37 AM

## 2024-11-25 NOTE — PROGRESS NOTES
[facility]                                Progress note    Subjective:    The patient is awake and alert.    Complaining of no shortness of breath chest discomfort or pain  Denies any acute issues overnight    Objective:    /77   Pulse 67   Temp 97.5 °F (36.4 °C) (Oral)   Resp 18   Ht 1.524 m (5')   Wt 54.4 kg (120 lb)   SpO2 100%   BMI 23.44 kg/m²     In: -   Out: 10   In: -   Out: 10     General appearance: NAD, conversant  HEENT: AT/NC, MMM  Neck: FROM, supple  Lungs: Diminished  CV: RRR, no MRGs  Vasc: Radial pulses 2+  Abdomen: Soft, non-tender; no masses or HSM  Extremities: No peripheral edema or digital cyanosis  Skin: no rash, lesions or ulcers  Psych: Alert and oriented to person, place , intermittent confusion  Neuro: Alert and interactive     Recent Labs     11/23/24  0430 11/24/24  0400 11/25/24  0524   WBC 19.1* 12.5* 6.7   HGB 8.5* 7.7* 7.4*   HCT 29.1* 25.9* 27.2*    228 164       Recent Labs     11/23/24  0430 11/24/24  0400 11/25/24  0524    139 138   K 4.1 3.6 3.4*   * 111* 112*   CO2 22 20* 18*   BUN 32* 31* 28*   CREATININE 1.8* 1.6* 1.5*   CALCIUM 8.1* 7.7* 8.1*       Assessment:    Principal Problem:    Sepsis (HCC)  Active Problems:    Pleural effusion    Acute cystitis without hematuria  Resolved Problems:    * No resolved hospital problems. *      Plan:    Patient is an 89-year-old female admitted to [facility] for  Sepsis  -Monitor labs  -Resolution of leukocytosis, 6.7  -Remains on IV Zosyn  -MRI of the pelvis with osteomyelitis of the bilateral pubic rami  -IR consulted for drainage of fluid collection  -Follow cultures  -No further needs from surgery  -s/p left thoracentesis on 11/23 which appears to be transudative  -Continue IV Solu-Cortef 100 mg q8h   -Weaned off of supplemental O2 satting well on room air  -Vital signs stable  -Await discharge plans from case management    Code status: full  Consultants: GS, ID and pulm      DVT  Prophylaxis Lovenox  PT/OT  Discharge planning       MACK Cardenas - CNP  2:37 PM  11/25/2024

## 2024-11-25 NOTE — ACP (ADVANCE CARE PLANNING)
Advance Care Planning   Healthcare Decision Maker:    Primary Decision Maker: LocoLore - Child - [phone]    Click here to complete Healthcare Decision Makers including selection of the Healthcare Decision Maker Relationship (ie \"Primary\").

## 2024-11-25 NOTE — FLOWSHEET NOTE
Inpatient Wound Care (initial consult) 411    Admit Date: 11/22/2024 10:25 PM    Reason for consult:  fistula, coccyx    Patient laying down in bed, awake, alert and oriented. Assist of two people to roll.    Significant history:  per H&P    CHIEF COMPLAINT:  Sepsis (HCC)     HISTORY OF PRESENT ILLNESS:    The patient is a 89 y.o. female who presents with fatigue, fever, nausea and vomiting.  When asked she was not sure why she is here.  She states she lives with her daughter.  As per EMS her blood pressure was also low.    Past Medical History:   Diagnosis Date    Cataract     bilat    Chronic kidney disease, stage 3 (HCC)     Diabetes mellitus (HCC)     Diarrhea     Gout     Hypertension     Obesity     Osteoarthritis     Osteoarthritis     Osteoporosis     Recurrent UTI     Uterine cancer (HCC)      Wound history: From radiation due to cervical cancer    Findings:     11/25/24 1007   Skin Integumentary    Skin Integrity Redness;Other (comment)  (with blanching)   Location both heels   Skin Integrity Site 2   Skin Integrity Location 2 Redness;Other (comment)  (with blanching)   Location 2 right ankle   Wound 11/22/24 Sacrum   Date First Assessed/Time First Assessed: 11/22/24 2230   Present on Original Admission: Yes  Primary Wound Type: Pressure Injury  Location: Sacrum   Wound Image    Wound Etiology Pressure Stage 2   Dressing Status New dressing applied   Wound Cleansed Cleansed with saline   Dressing/Treatment Foam  (Opticell)   Wound Length (cm) 1 cm   Wound Width (cm) 1 cm   Wound Depth (cm) 0.2 cm   Wound Surface Area (cm^2) 1 cm^2   Change in Wound Size % (l*w) -100   Wound Volume (cm^3) 0.2 cm^3   Wound Healing % -300   Wound Assessment Pink/red   Drainage Amount Scant (moist but unmeasurable)   Drainage Description Serosanguinous   Odor None   Juana-wound Assessment Blanchable erythema;Dry/flaky     Fistula with black sponge/screen intact in wound not consistent with wound vac foam.     **Informed

## 2024-11-26 ENCOUNTER — APPOINTMENT (OUTPATIENT)
Dept: GENERAL RADIOLOGY | Age: 89
DRG: 871 | End: 2024-11-26
Attending: INTERNAL MEDICINE
Payer: MEDICARE

## 2024-11-26 ENCOUNTER — APPOINTMENT (OUTPATIENT)
Dept: CT IMAGING | Age: 89
DRG: 871 | End: 2024-11-26
Attending: INTERNAL MEDICINE
Payer: MEDICARE

## 2024-11-26 LAB
ACB COMPLEX DNA BLD POS QL NAA+NON-PROBE: NOT DETECTED
ALBUMIN SERPL-MCNC: 2.3 G/DL (ref 3.5–5.2)
ALP SERPL-CCNC: 70 U/L (ref 35–104)
ALT SERPL-CCNC: 7 U/L (ref 0–32)
ANION GAP SERPL CALCULATED.3IONS-SCNC: 12 MMOL/L (ref 7–16)
AST SERPL-CCNC: 9 U/L (ref 0–31)
B FRAGILIS DNA BLD POS QL NAA+NON-PROBE: NOT DETECTED
BASOPHILS # BLD: 0 K/UL (ref 0–0.2)
BASOPHILS NFR BLD: 0 % (ref 0–2)
BILIRUB SERPL-MCNC: 0.2 MG/DL (ref 0–1.2)
BIOFIRE TEST COMMENT: ABNORMAL
BLACTX-M ISLT/SPM QL: NOT DETECTED
BLAIMP ISLT/SPM QL: NOT DETECTED
BLAKPC ISLT/SPM QL: NOT DETECTED
BLAOXA-48-LIKE ISLT/SPM QL: NOT DETECTED
BLAVIM ISLT/SPM QL: NOT DETECTED
BUN SERPL-MCNC: 30 MG/DL (ref 6–23)
C ALBICANS DNA BLD POS QL NAA+NON-PROBE: NOT DETECTED
C AURIS DNA BLD POS QL NAA+NON-PROBE: NOT DETECTED
C GATTII+NEOFOR DNA BLD POS QL NAA+N-PRB: NOT DETECTED
C GLABRATA DNA BLD POS QL NAA+NON-PROBE: NOT DETECTED
C KRUSEI DNA BLD POS QL NAA+NON-PROBE: NOT DETECTED
C PARAP DNA BLD POS QL NAA+NON-PROBE: NOT DETECTED
C TROPICLS DNA BLD POS QL NAA+NON-PROBE: NOT DETECTED
CALCIUM SERPL-MCNC: 8.5 MG/DL (ref 8.6–10.2)
CHLORIDE SERPL-SCNC: 111 MMOL/L (ref 98–107)
CO2 SERPL-SCNC: 19 MMOL/L (ref 22–29)
COLISTIN RES MCR-1 ISLT/SPM QL: NOT DETECTED
CREAT SERPL-MCNC: 1.4 MG/DL (ref 0.5–1)
E CLOAC COMP DNA BLD POS NAA+NON-PROBE: DETECTED
E COLI DNA BLD POS QL NAA+NON-PROBE: NOT DETECTED
E FAECALIS DNA BLD POS QL NAA+NON-PROBE: NOT DETECTED
E FAECIUM DNA BLD POS QL NAA+NON-PROBE: NOT DETECTED
ENTEROBACTERALES DNA BLD POS NAA+N-PRB: DETECTED
EOSINOPHIL # BLD: 0 K/UL (ref 0.05–0.5)
EOSINOPHILS RELATIVE PERCENT: 0 % (ref 0–6)
ERYTHROCYTE [DISTWIDTH] IN BLOOD BY AUTOMATED COUNT: 18.4 % (ref 11.5–15)
GFR, ESTIMATED: 35 ML/MIN/1.73M2
GLUCOSE SERPL-MCNC: 102 MG/DL (ref 74–99)
GP B STREP DNA BLD POS QL NAA+NON-PROBE: NOT DETECTED
HAEM INFLU DNA BLD POS QL NAA+NON-PROBE: NOT DETECTED
HCT VFR BLD AUTO: 30.6 % (ref 34–48)
HGB BLD-MCNC: 8.7 G/DL (ref 11.5–15.5)
K OXYTOCA DNA BLD POS QL NAA+NON-PROBE: NOT DETECTED
KLEBSIELLA SP DNA BLD POS QL NAA+NON-PRB: NOT DETECTED
KLEBSIELLA SP DNA BLD POS QL NAA+NON-PRB: NOT DETECTED
L MONOCYTOG DNA BLD POS QL NAA+NON-PROBE: NOT DETECTED
LYMPHOCYTES NFR BLD: 0.22 K/UL (ref 1.5–4)
LYMPHOCYTES RELATIVE PERCENT: 4 % (ref 20–42)
MAGNESIUM SERPL-MCNC: 2 MG/DL (ref 1.6–2.6)
MCH RBC QN AUTO: 25.7 PG (ref 26–35)
MCHC RBC AUTO-ENTMCNC: 28.4 G/DL (ref 32–34.5)
MCV RBC AUTO: 90.3 FL (ref 80–99.9)
MICROORGANISM SPEC CULT: ABNORMAL
MICROORGANISM SPEC CULT: NO GROWTH
MICROORGANISM/AGENT SPEC: ABNORMAL
MICROORGANISM/AGENT SPEC: NORMAL
MONOCYTES NFR BLD: 0.11 K/UL (ref 0.1–0.95)
MONOCYTES NFR BLD: 2 % (ref 2–12)
MYELOCYTES ABSOLUTE COUNT: 0.05 K/UL
MYELOCYTES: 1 %
N MEN DNA BLD POS QL NAA+NON-PROBE: NOT DETECTED
NEUTROPHILS NFR BLD: 94 % (ref 43–80)
NEUTS SEG NFR BLD: 5.82 K/UL (ref 1.8–7.3)
P AERUGINOSA DNA BLD POS NAA+NON-PROBE: NOT DETECTED
PHOSPHATE SERPL-MCNC: 2.7 MG/DL (ref 2.5–4.5)
PLATELET # BLD AUTO: 187 K/UL (ref 130–450)
PMV BLD AUTO: 9.8 FL (ref 7–12)
POTASSIUM SERPL-SCNC: 3.4 MMOL/L (ref 3.5–5)
PROT SERPL-MCNC: 5 G/DL (ref 6.4–8.3)
PROTEUS SP DNA BLD POS QL NAA+NON-PROBE: NOT DETECTED
RBC # BLD AUTO: 3.39 M/UL (ref 3.5–5.5)
RBC # BLD: ABNORMAL 10*6/UL
RESISTANT GENE NDM BY PCR: NOT DETECTED
S AUREUS DNA BLD POS QL NAA+NON-PROBE: NOT DETECTED
S AUREUS+CONS DNA BLD POS NAA+NON-PROBE: NOT DETECTED
S EPIDERMIDIS DNA BLD POS QL NAA+NON-PRB: NOT DETECTED
S LUGDUNENSIS DNA BLD POS QL NAA+NON-PRB: NOT DETECTED
S MALTOPHILIA DNA BLD POS QL NAA+NON-PRB: NOT DETECTED
S MARCESCENS DNA BLD POS NAA+NON-PROBE: NOT DETECTED
S PNEUM DNA BLD POS QL NAA+NON-PROBE: NOT DETECTED
S PYO DNA BLD POS QL NAA+NON-PROBE: NOT DETECTED
SALMONELLA DNA BLD POS QL NAA+NON-PROBE: NOT DETECTED
SERVICE CMNT-IMP: ABNORMAL
SERVICE CMNT-IMP: NORMAL
SODIUM SERPL-SCNC: 142 MMOL/L (ref 132–146)
SPECIMEN DESCRIPTION: ABNORMAL
SPECIMEN DESCRIPTION: NORMAL
STREPTOCOCCUS DNA BLD POS NAA+NON-PROBE: NOT DETECTED
WBC OTHER # BLD: 6.2 K/UL (ref 4.5–11.5)

## 2024-11-26 PROCEDURE — 6360000002 HC RX W HCPCS: Performed by: INTERNAL MEDICINE

## 2024-11-26 PROCEDURE — 87070 CULTURE OTHR SPECIMN AEROBIC: CPT

## 2024-11-26 PROCEDURE — 2060000000 HC ICU INTERMEDIATE R&B

## 2024-11-26 PROCEDURE — 2709999900 CT ASP ABS HEMATOMA BULLA CYST

## 2024-11-26 PROCEDURE — 6360000002 HC RX W HCPCS: Performed by: SPECIALIST

## 2024-11-26 PROCEDURE — 2580000003 HC RX 258: Performed by: SPECIALIST

## 2024-11-26 PROCEDURE — 6360000002 HC RX W HCPCS

## 2024-11-26 PROCEDURE — 83735 ASSAY OF MAGNESIUM: CPT

## 2024-11-26 PROCEDURE — 84100 ASSAY OF PHOSPHORUS: CPT

## 2024-11-26 PROCEDURE — 87077 CULTURE AEROBIC IDENTIFY: CPT

## 2024-11-26 PROCEDURE — 85025 COMPLETE CBC W/AUTO DIFF WBC: CPT

## 2024-11-26 PROCEDURE — 32551 INSERTION OF CHEST TUBE: CPT

## 2024-11-26 PROCEDURE — 71045 X-RAY EXAM CHEST 1 VIEW: CPT

## 2024-11-26 PROCEDURE — 51798 US URINE CAPACITY MEASURE: CPT

## 2024-11-26 PROCEDURE — 87205 SMEAR GRAM STAIN: CPT

## 2024-11-26 PROCEDURE — 6370000000 HC RX 637 (ALT 250 FOR IP)

## 2024-11-26 PROCEDURE — 36415 COLL VENOUS BLD VENIPUNCTURE: CPT

## 2024-11-26 PROCEDURE — 0W9B30Z DRAINAGE OF LEFT PLEURAL CAVITY WITH DRAINAGE DEVICE, PERCUTANEOUS APPROACH: ICD-10-PCS | Performed by: INTERNAL MEDICINE

## 2024-11-26 PROCEDURE — 87185 SC STD ENZYME DETCJ PER NZM: CPT

## 2024-11-26 PROCEDURE — 80053 COMPREHEN METABOLIC PANEL: CPT

## 2024-11-26 PROCEDURE — 87075 CULTR BACTERIA EXCEPT BLOOD: CPT

## 2024-11-26 PROCEDURE — 2580000003 HC RX 258

## 2024-11-26 RX ORDER — SODIUM CHLORIDE 0.9 % (FLUSH) 0.9 %
5-40 SYRINGE (ML) INJECTION EVERY 12 HOURS SCHEDULED
Status: DISCONTINUED | OUTPATIENT
Start: 2024-11-26 | End: 2024-11-30

## 2024-11-26 RX ORDER — LIDOCAINE HYDROCHLORIDE 10 MG/ML
50 INJECTION, SOLUTION EPIDURAL; INFILTRATION; INTRACAUDAL; PERINEURAL ONCE
Status: DISCONTINUED | OUTPATIENT
Start: 2024-11-26 | End: 2024-12-05 | Stop reason: HOSPADM

## 2024-11-26 RX ORDER — MIDAZOLAM HYDROCHLORIDE 2 MG/2ML
1 INJECTION, SOLUTION INTRAMUSCULAR; INTRAVENOUS ONCE
Status: COMPLETED | OUTPATIENT
Start: 2024-11-26 | End: 2024-11-26

## 2024-11-26 RX ORDER — HYDROCORTISONE SODIUM SUCCINATE 100 MG/2ML
50 INJECTION INTRAMUSCULAR; INTRAVENOUS EVERY 8 HOURS
Status: DISCONTINUED | OUTPATIENT
Start: 2024-11-26 | End: 2024-11-30

## 2024-11-26 RX ORDER — FENTANYL CITRATE 50 UG/ML
25 INJECTION, SOLUTION INTRAMUSCULAR; INTRAVENOUS ONCE
Status: COMPLETED | OUTPATIENT
Start: 2024-11-26 | End: 2024-11-26

## 2024-11-26 RX ORDER — SODIUM CHLORIDE 9 MG/ML
INJECTION, SOLUTION INTRAVENOUS PRN
Status: DISCONTINUED | OUTPATIENT
Start: 2024-11-26 | End: 2024-12-05 | Stop reason: HOSPADM

## 2024-11-26 RX ORDER — SODIUM CHLORIDE 0.9 % (FLUSH) 0.9 %
5-40 SYRINGE (ML) INJECTION PRN
Status: DISCONTINUED | OUTPATIENT
Start: 2024-11-26 | End: 2024-12-05 | Stop reason: HOSPADM

## 2024-11-26 RX ADMIN — MEROPENEM 1000 MG: 1 INJECTION INTRAVENOUS at 17:44

## 2024-11-26 RX ADMIN — MIDAZOLAM HYDROCHLORIDE 1 MG: 1 INJECTION, SOLUTION INTRAMUSCULAR; INTRAVENOUS at 11:31

## 2024-11-26 RX ADMIN — FENTANYL CITRATE 25 MCG: 50 INJECTION INTRAMUSCULAR; INTRAVENOUS at 11:41

## 2024-11-26 RX ADMIN — HYDROCORTISONE SODIUM SUCCINATE 50 MG: 100 INJECTION, POWDER, FOR SOLUTION INTRAMUSCULAR; INTRAVENOUS at 17:50

## 2024-11-26 RX ADMIN — PANTOPRAZOLE SODIUM 40 MG: 40 TABLET, DELAYED RELEASE ORAL at 05:43

## 2024-11-26 RX ADMIN — PIPERACILLIN AND TAZOBACTAM 3375 MG: 3; .375 INJECTION, POWDER, LYOPHILIZED, FOR SOLUTION INTRAVENOUS at 00:45

## 2024-11-26 RX ADMIN — HYDROCORTISONE SODIUM SUCCINATE 100 MG: 100 INJECTION INTRAMUSCULAR; INTRAVENOUS at 03:39

## 2024-11-26 RX ADMIN — SODIUM CHLORIDE, PRESERVATIVE FREE 10 ML: 5 INJECTION INTRAVENOUS at 00:45

## 2024-11-26 RX ADMIN — SODIUM CHLORIDE, PRESERVATIVE FREE 10 ML: 5 INJECTION INTRAVENOUS at 20:19

## 2024-11-26 RX ADMIN — SODIUM CHLORIDE, PRESERVATIVE FREE 10 ML: 5 INJECTION INTRAVENOUS at 08:42

## 2024-11-26 NOTE — CARE COORDINATION
Plan remains return to [facility] at time of discharge.  No prior auth required.  Daughter has been working with [facility] for potential enrollment on patient's arrival back at facility. Spoke with daughter in room - plans for Hospice on hold - she desires to proceed with treatment for her mother (patient).  Noted are pulm's plan for chest tube insertion today based on cxr findings.  ID and IR still following - fluid drainage planned today with cultures.  JAMEY initiated, envelope completed with demos, transport forms.  Patient will need followed and assisted with discharge planning as necessary.   Can Grant, MSN RN  [facility] Case Management  [phone]

## 2024-11-26 NOTE — PROGRESS NOTES
[facility]                                Progress note    Subjective:    Seen in transit to IR for procedure   No acute complaints.     Objective:    BP (!) 115/56   Pulse (!) 42   Temp 97.4 °F (36.3 °C) (Temporal)   Resp 18   Ht 1.524 m (5')   Wt 60 kg (132 lb 4.4 oz) Comment: changing out beds-CBW may be error  SpO2 97%   BMI 25.83 kg/m²     In: -   Out: 110   In: -   Out: 110     General appearance: NAD, conversant  HEENT: AT/NC, MMM  Neck: FROM, supple  Lungs: Diminished, L CT   CV: RRR, no MRGs  Vasc: Radial pulses 2+  Abdomen: Soft, non-tender; no masses or HSM  Extremities: No peripheral edema or digital cyanosis  Skin: no rash, lesions or ulcers  Psych: Alert and oriented to person, place , intermittent confusion  Neuro: Alert and interactive     Recent Labs     11/24/24  0400 11/25/24  0524 11/26/24  0702   WBC 12.5* 6.7 6.2   HGB 7.7* 7.4* 8.7*   HCT 25.9* 27.2* 30.6*    164 187       Recent Labs     11/24/24  0400 11/25/24  0524 11/26/24  0702    138 142   K 3.6 3.4* 3.4*   * 112* 111*   CO2 20* 18* 19*   BUN 31* 28* 30*   CREATININE 1.6* 1.5* 1.4*   CALCIUM 7.7* 8.1* 8.5*       Assessment:    Principal Problem:    Sepsis (HCC)  Active Problems:    Pleural effusion    Acute cystitis without hematuria    Severe protein-calorie malnutrition (HCC)  Resolved Problems:    * No resolved hospital problems. *      Plan:    Patient is an 89-year-old female admitted to [facility] for  Sepsis  -Monitor labs  -Resolution of leukocytosis, 6.7  -Remains on IV Zosyn  -MRI of the pelvis with osteomyelitis of the bilateral pubic rami  -IR consulted for drainage of fluid collection  -Follow cultures  -No further needs from surgery  -s/p left thoracentesis on 11/23 which appears to be transudative  -Continue IV Solu-Cortef 100 mg q8h   -Replace electrolytes as needed  -Weaned off of supplemental O2 satting well on room air  -Vital signs stable  -Await discharge plans from  case management    11/26/24  -CXR: Moderate to large left pneumothorax  -Chest tube placed by pulmonology today  -IR procedure for drainage of abscess  -Follow cultures  -IV Zosyn switched to IV Merrem per infectious disease  -Begin weaning of Solu-Cortef 50 mg every 8 hours  -PICC line ordered by ID for ongoing antibiotics on discharge  -K+ 3.4, replace  -Kidney function remains about the same at 30/1.4  -Improvement in H&H, 8.7/30.6  -Continue to monitor labs and vitals    Code status: full  Consultants: GS, ID and pulm      DVT Prophylaxis Lovenox  PT/OT  Discharge planning       MACK Cardenas - CNP  2:38 PM  11/26/2024

## 2024-11-26 NOTE — PROGRESS NOTES
Okay for a picc line insertion from renal point of view    Electronically signed by MACK Zhang - CNP on 11/26/2024 at 4:06 PM

## 2024-11-26 NOTE — PROGRESS NOTES
piperacillin-tazobactam  3,375 mg IntraVENous Q12H    And    sodium chloride (PF)  10 mL IntraVENous Q12H    sodium chloride flush  5-40 mL IntraVENous 2 times per day    enoxaparin  30 mg SubCUTAneous Daily    pantoprazole  40 mg Oral QAM AC    hydrocortisone sodium succinate PF  100 mg IntraVENous Q8H       Physical Exam:  General Appearance: appears comfortable in no acute distress. Room air   HEENT: Normocephalic atraumatic without obvious abnormality   Neck: Lips, mucosa, and tongue normal.  Supple, symmetrical, trachea midline, no adenopathy;thyroid:  no enlargement/tenderness/nodules or JVD.  Lung: Breath sounds clear and diminished.  Respirations   unlabored. Symmetrical expansion.  Heart: RRR, normal S1, S2.  Abdomen: Soft, NT, ND. BS present x 4 quadrants. No bruit or organomegaly.   Extremities: Pedal pulses 2+ symmetric b/l.  Extremities normal, no cyanosis, clubbing, or edema.   Musculokeletal: No joint swelling, no muscle tenderness. ROM normal in all joints of extremities.   Neurologic: Mental status: Alert and Oriented X2-3 .forgetful ,pleasantly confused     Pertinent/ New Labs and Imaging Studies     Imaging personally reviewed:  CXR 11/26/24:  FINDINGS:  Cardiac size at least borderline enlarged.  Moderate to large volume left  pneumothorax without significant mediastinal shift.  Increased central  congestion and atelectatic changes in the left lung from adjacent effects  of  the pneumothorax.     IMPRESSION:  1. Moderate to large volume left pneumothorax without significant mediastinal  shift.  Close interval attention on follow-up consider chest x-ray at 4-6  hours minimum  2. Increased central congestion and atelectatic changes in the left lung.      Echo 11/25/24:  Interpretation Summary    Left Ventricle: The left ventricle is normal in size with normal thickness of endocardial surfaces.  Echocardiographic contrast was administered to enhance endocardial definition.  No regional wall motion    Enterobacter Cloacae Complex DETECTED Abnormal  P  - [facility]   Enterobacterales DETECTED Abnormal  P  - [facility]   Klebsiella oxytoca Not Detected P  - [facility]   Klebsiella aerogenes Not Detected P  - [facility]   Klebsiella pneumoniae group Not Detected P  - [facility]   Pseudomonas aeruginosa Not Detected P  - [facility]   Proteus spp Not Detected P  - [facility]   Salmonella species Not Detected P  - [facility]   SERRATIA MARCESCENS Not Detected P  - [facility]   Stenotrophomonas maltophilia Not Detected P  - [facility]   Haemophilus influenzae Not Detected P  - [facility]   Listeria monocytogenes Not Detected P  - [facility]   Bacteroides fragilis Not Detected P  - [facility]   Neisseria Meningitidis, NMNI Not Detected P  - [facility]   Staphylococcus spp Not Detected P  - [facility]   Staphylococcus Aureus Not Detected P  - [facility]   Staphylococcus epidermidis Not Detected P  - [facility]   Staphylococcus lugdunensis Not Detected P  - [facility]   Streptococcus spp Not Detected P  - [facility]   Enterococcus faecalis Not Detected P  - [facility]   Enterococcus faecium Not Detected P  - [facility]   Streptococcus agalactiae (Group B) Not Detected P  - [facility]   Streptococcus pneumoniae Not Detected P  - [facility]   Streptococcus pyogenes Not Detected P  - [facility]   Resistant gene imp by PCR Not Detected P  - [facility]   Resistant gene ctx-m by PCR Not Detected P  - [facility]    Resistant gene kpc by PCR Not Detected P [facility]   Colistin Resistance mcr-1 gene by PCR Not Detected P [facility]   Resistant gene ndm by PCR Not Detected P [facility]   Resistant gene oxa-48-like by pcr Not Detected P [facility]   Resistant gene vim by PCR Not Detected P          Latest Reference Range & Units 11/23/24 16:30   Clot Check  None Seen   Color, Fluid  Yellow   Appearance, Fluid  Clear   RBC, Fluid cells/uL 2,000   Glucose, Fluid mg/dL 156   LD, Fluid U/L 87   Monocyte Count, Fluid % 70   Neutrophil Count, Fluid % 30   Total Protein, Body Fluid g/dL 3.3   WBC, Fluid cells/uL 164     Component  Resulting Agency   Specimen Description .PLEURAL FLUID [facility]   Special Requests Site: Body Fluid [facility]   Direct Exam Gram stain performed on unspun fluid. [facility]    NO EPITHELIAL CELLS [facility]    RARE Polymorphonuclear leukocytes [facility]    NO ORGANISMS SEEN [facility]   Culture NO GROWTH [facility]              Specimen Collected: 11/23/24 16:30 EST Last Resulted: 11/26/24 06:18 EST           Assessment:    Left pleural effusion, s/p thoracentesis 11/23/24 with 2.1 L removed, transudate, culture negative  Left pneumothorax  Acute hypoxic respiratory failure  Sepsis secondary to UTI/hydronephrosis   Uterine cancer  UTI  Chronic pubic symphysis osteomyelitis, s/p colocutaneous fistula . Follows at [facility]      Plan:   Oxygen weaned off  Left thora completed 11/23, fluid transudate based on Light's criteria, culture negative, follow cytology  CXR 11/26 reviewed - moderate PTX. Chest tube to be placed today at the bedside. Will place to suction  Follow daily imaging while chest tube present  Decrease Solu Cortef to 50 mg q8h -  continue to

## 2024-11-26 NOTE — OR NURSING
Patient taken to Cat Scan from room 411, positioned on table supine with O2 and monitoring equipment attached. Patient scanned and images reviewed by Dr Judge. Patient prepped and draped per protocol. 5 fr x 7 cm one step placed to left pelvis with Cat Scan guidance by Dr Judge @ 1410, 12 ml bloody purulent specimen obtained and sent to lab for culture and gram stain. Patient re-scanned. Puncture site cleansed,dry dressing applied to site. Patient tolerated well. Procedure completed @ 1413, patient transported back to room 411 and nurse handoff report called to the floor.

## 2024-11-26 NOTE — PLAN OF CARE
Problem: Chronic Conditions and Co-morbidities  Goal: Patient's chronic conditions and co-morbidity symptoms are monitored and maintained or improved  11/25/2024 2319 by Celsa Granados RN  Outcome: Progressing  11/25/2024 2049 by Sujatha Lopez RN  Outcome: Progressing     Problem: Discharge Planning  Goal: Discharge to home or other facility with appropriate resources  11/25/2024 2319 by Celsa Granados RN  Outcome: Progressing  11/25/2024 2049 by Sujatha Lopez RN  Outcome: Progressing     Problem: Skin/Tissue Integrity  Goal: Absence of new skin breakdown  Description: 1.  Monitor for areas of redness and/or skin breakdown  2.  Assess vascular access sites hourly  3.  Every 4-6 hours minimum:  Change oxygen saturation probe site  4.  Every 4-6 hours:  If on nasal continuous positive airway pressure, respiratory therapy assess nares and determine need for appliance change or resting period.  11/25/2024 2319 by Celsa Granados RN  Outcome: Progressing  11/25/2024 2049 by Sujatha Lopez RN  Outcome: Progressing     Problem: ABCDS Injury Assessment  Goal: Absence of physical injury  11/25/2024 2049 by Sujatha Lopez RN  Outcome: Progressing     Problem: Safety - Adult  Goal: Free from fall injury  11/25/2024 2049 by Sujatha Lopez RN  Outcome: Progressing     Problem: Pain  Goal: Verbalizes/displays adequate comfort level or baseline comfort level  11/25/2024 2049 by Sujatha Lopez RN  Outcome: Progressing     Problem: Nutrition Deficit:  Goal: Optimize nutritional status  11/25/2024 2049 by Sujatha Lopez RN  Outcome: Progressing

## 2024-11-26 NOTE — PLAN OF CARE

## 2024-11-26 NOTE — PROGRESS NOTES
Spoke with HADLEY Arana, notified need renal clearance before PICC will be inserted and renal isn't on the case.  Safia Loyd RN

## 2024-11-26 NOTE — PROGRESS NOTES
Consulted for PICC placement. For vein preservation, INS standards recommend that a nephrologist okays the placement pf a PICC/Midline in patients with CKD. Please check with the nephrologist and note if it is okay for me to insert a PICC/Midline. Spoke with floor RN.    Electronically signed by Dania Charles RN on 11/26/2024 at 2:25 PM

## 2024-11-26 NOTE — PROCEDURES
CHEST TUBE INSERTION NOTE    Date of Procedure: 11/26/24    Procedure: left chest tube anterior 14 Fr pigtail chest tube  Attending: Felix Galarza DO  Assist: Dr. Ybarra  Indication: moderate pneumothorax    Consent: obtained    Anesthesia: 1 mg versed, 25 mcg fentanyl, 5 cc of 1% lidocaine    Ultrasound: No    Description:  Time out performed and appropriate procedure location confirmed.  Landmarks identified.  Area prepped and draped in sterile fashion.  Anesthesia: 1 mg versed, 25 mcg fentanyl, 5 cc of 1% lidocaine.  Modified Seldinger technique?: Yes.  14 [place] chest tube was inserted on the left anterior. Chest x-ray ordered to confirm appropriate placement.       Complications: none    ELECTRONICALLY SIGNED:  Felix Galarza DO  11/26/2024  1:42 PM

## 2024-11-26 NOTE — PROGRESS NOTES
Infectious Disease  Progress Note  [facility]    Chief Complaint: sepsis    Subjective:   The patient remains afebrile.  Denies dyspnea.  She had a thoracentesis.  Tolerating antibiotics.    Scheduled Meds:   hydrocortisone sodium succinate PF  50 mg IntraVENous Q8H    piperacillin-tazobactam  3,375 mg IntraVENous Q12H    And    sodium chloride (PF)  10 mL IntraVENous Q12H    sodium chloride flush  5-40 mL IntraVENous 2 times per day    enoxaparin  30 mg SubCUTAneous Daily    pantoprazole  40 mg Oral QAM AC     Continuous Infusions:   sodium chloride      dextrose       PRN Meds:sodium chloride flush, sodium chloride, potassium chloride **OR** potassium alternative oral replacement **OR** [DISCONTINUED] potassium chloride, magnesium sulfate, ondansetron **OR** ondansetron, polyethylene glycol, acetaminophen **OR** acetaminophen, glucose, dextrose bolus **OR** dextrose bolus, glucagon (rDNA), dextrose    Prior to Admission medications    Medication Sig Start Date End Date Taking? Authorizing Provider   amLODIPine (NORVASC) 2.5 MG tablet Take 1 tablet by mouth daily Hold if SBP < 120   Yes Delfino Gonzalez MD   sodium chloride 0.9 % SOLN 100 mL with ferric gluconate 12.5 MG/ML SOLN 125 mg Infuse 125 mg intravenously every 7 days THURSDAYS 11/21/24 12/26/24 Yes Delfino Gonzalez MD   gabapentin (NEURONTIN) 100 MG capsule Take 1 capsule by mouth 2 times daily.   Yes Delfino Gonzalez MD   ondansetron (ZOFRAN-ODT) 4 MG disintegrating tablet Take 1 tablet by mouth every 8 hours as needed for Nausea or Vomiting   Yes Delfino Gonzalez MD   sodium bicarbonate 650 MG tablet Take 1 tablet by mouth 3 times daily   Yes Delfino Gonzalez MD   donepezil (ARICEPT) 10 MG tablet Take 1 tablet by mouth nightly    Delfino Gonzalez MD   memantine (NAMENDA) 5 MG tablet Take 1 tablet by mouth 2 times daily    Delfino Gonzalez MD   Multiple Vitamin (MULTIVITAMIN ADULT PO) Take by mouth    Delfino Gonzalez

## 2024-11-27 ENCOUNTER — APPOINTMENT (OUTPATIENT)
Dept: GENERAL RADIOLOGY | Age: 89
DRG: 871 | End: 2024-11-27
Attending: INTERNAL MEDICINE
Payer: MEDICARE

## 2024-11-27 LAB
ALBUMIN SERPL-MCNC: 2.3 G/DL (ref 3.5–5.2)
ALP SERPL-CCNC: 75 U/L (ref 35–104)
ALT SERPL-CCNC: 7 U/L (ref 0–32)
ANION GAP SERPL CALCULATED.3IONS-SCNC: 8 MMOL/L (ref 7–16)
AST SERPL-CCNC: 10 U/L (ref 0–31)
BASOPHILS # BLD: 0 K/UL (ref 0–0.2)
BASOPHILS NFR BLD: 0 % (ref 0–2)
BILIRUB SERPL-MCNC: 0.2 MG/DL (ref 0–1.2)
BUN SERPL-MCNC: 34 MG/DL (ref 6–23)
CALCIUM SERPL-MCNC: 8.8 MG/DL (ref 8.6–10.2)
CHLORIDE SERPL-SCNC: 113 MMOL/L (ref 98–107)
CO2 SERPL-SCNC: 20 MMOL/L (ref 22–29)
CREAT SERPL-MCNC: 1.5 MG/DL (ref 0.5–1)
EOSINOPHIL # BLD: 0 K/UL (ref 0.05–0.5)
EOSINOPHILS RELATIVE PERCENT: 0 % (ref 0–6)
ERYTHROCYTE [DISTWIDTH] IN BLOOD BY AUTOMATED COUNT: 18.4 % (ref 11.5–15)
GFR, ESTIMATED: 33 ML/MIN/1.73M2
GLUCOSE SERPL-MCNC: 117 MG/DL (ref 74–99)
HCT VFR BLD AUTO: 34.3 % (ref 34–48)
HGB BLD-MCNC: 9.9 G/DL (ref 11.5–15.5)
LYMPHOCYTES NFR BLD: 0.71 K/UL (ref 1.5–4)
LYMPHOCYTES RELATIVE PERCENT: 11 % (ref 20–42)
MAGNESIUM SERPL-MCNC: 1.9 MG/DL (ref 1.6–2.6)
MCH RBC QN AUTO: 25.4 PG (ref 26–35)
MCHC RBC AUTO-ENTMCNC: 28.9 G/DL (ref 32–34.5)
MCV RBC AUTO: 88.2 FL (ref 80–99.9)
MONOCYTES NFR BLD: 0.22 K/UL (ref 0.1–0.95)
MONOCYTES NFR BLD: 4 % (ref 2–12)
NEUTROPHILS NFR BLD: 85 % (ref 43–80)
NEUTS SEG NFR BLD: 5.28 K/UL (ref 1.8–7.3)
NON-GYN CYTOLOGY REPORT: NORMAL
NUCLEATED RED BLOOD CELLS: 1 PER 100 WBC
PHOSPHATE SERPL-MCNC: 3.1 MG/DL (ref 2.5–4.5)
PLATELET # BLD AUTO: 228 K/UL (ref 130–450)
PMV BLD AUTO: 9.9 FL (ref 7–12)
POTASSIUM SERPL-SCNC: 3.6 MMOL/L (ref 3.5–5)
PROT SERPL-MCNC: 5.2 G/DL (ref 6.4–8.3)
RBC # BLD AUTO: 3.89 M/UL (ref 3.5–5.5)
RBC # BLD: ABNORMAL 10*6/UL
SODIUM SERPL-SCNC: 141 MMOL/L (ref 132–146)
WBC OTHER # BLD: 6.2 K/UL (ref 4.5–11.5)

## 2024-11-27 PROCEDURE — 36415 COLL VENOUS BLD VENIPUNCTURE: CPT

## 2024-11-27 PROCEDURE — 2060000000 HC ICU INTERMEDIATE R&B

## 2024-11-27 PROCEDURE — 80053 COMPREHEN METABOLIC PANEL: CPT

## 2024-11-27 PROCEDURE — 85025 COMPLETE CBC W/AUTO DIFF WBC: CPT

## 2024-11-27 PROCEDURE — 84100 ASSAY OF PHOSPHORUS: CPT

## 2024-11-27 PROCEDURE — 71045 X-RAY EXAM CHEST 1 VIEW: CPT

## 2024-11-27 PROCEDURE — 6360000002 HC RX W HCPCS

## 2024-11-27 PROCEDURE — 83735 ASSAY OF MAGNESIUM: CPT

## 2024-11-27 PROCEDURE — 6360000002 HC RX W HCPCS: Performed by: SPECIALIST

## 2024-11-27 PROCEDURE — 2580000003 HC RX 258: Performed by: SPECIALIST

## 2024-11-27 RX ADMIN — MEROPENEM 1000 MG: 1 INJECTION INTRAVENOUS at 17:23

## 2024-11-27 RX ADMIN — HYDROCORTISONE SODIUM SUCCINATE 50 MG: 100 INJECTION, POWDER, FOR SOLUTION INTRAMUSCULAR; INTRAVENOUS at 17:23

## 2024-11-27 RX ADMIN — HYDROCORTISONE SODIUM SUCCINATE 50 MG: 100 INJECTION, POWDER, FOR SOLUTION INTRAMUSCULAR; INTRAVENOUS at 09:46

## 2024-11-27 RX ADMIN — HYDROCORTISONE SODIUM SUCCINATE 50 MG: 100 INJECTION, POWDER, FOR SOLUTION INTRAMUSCULAR; INTRAVENOUS at 02:24

## 2024-11-27 RX ADMIN — ENOXAPARIN SODIUM 30 MG: 100 INJECTION SUBCUTANEOUS at 10:23

## 2024-11-27 RX ADMIN — SODIUM CHLORIDE, PRESERVATIVE FREE 10 ML: 5 INJECTION INTRAVENOUS at 20:54

## 2024-11-27 RX ADMIN — SODIUM CHLORIDE, PRESERVATIVE FREE 10 ML: 5 INJECTION INTRAVENOUS at 07:40

## 2024-11-27 RX ADMIN — MEROPENEM 1000 MG: 1 INJECTION INTRAVENOUS at 05:57

## 2024-11-27 NOTE — PROGRESS NOTES
Infectious Disease  Progress Note  [facility]    Chief Complaint: sepsis    Subjective:   Lying in bed  Family member visiting  Has been refusing labs and PICC - seems agreeable now today   Tolerating antibiotics     Scheduled Meds:   hydrocortisone sodium succinate PF  50 mg IntraVENous Q8H    sodium chloride flush  5-40 mL IntraVENous 2 times per day    lidocaine 1 % injection  50 mg IntraDERmal Once    meropenem (MERREM) 1,000 mg in sodium chloride (PF) 0.9 % 20 mL IV syringe  1,000 mg IntraVENous Q12H    sodium chloride (PF)  10 mL IntraVENous Q12H    sodium chloride flush  5-40 mL IntraVENous 2 times per day    enoxaparin  30 mg SubCUTAneous Daily    pantoprazole  40 mg Oral QAM AC     Continuous Infusions:   sodium chloride      sodium chloride      dextrose       PRN Meds:sodium chloride flush, sodium chloride, sodium chloride flush, sodium chloride, potassium chloride **OR** potassium alternative oral replacement **OR** [DISCONTINUED] potassium chloride, magnesium sulfate, ondansetron **OR** ondansetron, polyethylene glycol, acetaminophen **OR** acetaminophen, glucose, dextrose bolus **OR** dextrose bolus, glucagon (rDNA), dextrose    Prior to Admission medications    Medication Sig Start Date End Date Taking? Authorizing Provider   amLODIPine (NORVASC) 2.5 MG tablet Take 1 tablet by mouth daily Hold if SBP < 120   Yes ProviderDelfino MD   sodium chloride 0.9 % SOLN 100 mL with ferric gluconate 12.5 MG/ML SOLN 125 mg Infuse 125 mg intravenously every 7 days THURSDAYS 11/21/24 12/26/24 Yes ProviderDelfino MD   gabapentin (NEURONTIN) 100 MG capsule Take 1 capsule by mouth 2 times daily.   Yes ProviderDelfino MD   ondansetron (ZOFRAN-ODT) 4 MG disintegrating tablet Take 1 tablet by mouth every 8 hours as needed for Nausea or Vomiting   Yes ProviderDelfino MD   sodium bicarbonate 650 MG tablet Take 1 tablet by mouth 3 times daily   Yes ProviderDelfino MD   donepezil (ARICEPT) 10 MG  tablet Take 1 tablet by mouth nightly    Delfino Gonzalez MD   memantine (NAMENDA) 5 MG tablet Take 1 tablet by mouth 2 times daily    Delfino Gonzalez MD   Multiple Vitamin (MULTIVITAMIN ADULT PO) Take by mouth    Delfino Gonzalez MD   valsartan (DIOVAN) 80 MG tablet Take 0.5 tablets by mouth daily    Delfino Gonzalez MD   Cholecalciferol (VITAMIN D-3 PO) Take 1,000 Int'l Units by mouth every 48 hours Takes one every other day    Delfino Gonzalez MD   metFORMIN (GLUCOPHAGE) 500 MG tablet Take 1 tablet by mouth daily (with breakfast)    Delfino Gonzalez MD        ROS:  As mentioned in subjective, all other systems negative      /60   Pulse 51   Temp 97.5 °F (36.4 °C) (Axillary)   Resp 16   Ht 1.524 m (5')   Wt 60 kg (132 lb 4.4 oz) Comment: changing out beds-CBW may be error  SpO2 96%   BMI 25.83 kg/m²     Physical Exam  Constitutional: The patient is sitting up in bed. Family member visiting. Somewhat confused but pleasant.    Skin: Warm and dry. Small wound sacral area.  HEENT: Eyes show round, and reactive pupils. Moist mucous membranes, no ulcerations, no thrush.   Neck: Supple to movements. No lymphadenopathy.   Chest: No respiratory distress.  No crackles.  Left small bore chest tube with serous fluid.  Cardiovascular: S1 and S2 are rhythmic and regular. No murmurs appreciated.   Abdomen: Soft, non tender. Open wound with colostomy. Puncture site dressed  Extremities: No edema.  Lines: Peripheral.    Radiology:  Reviewed     Microbiology:  Blood culture 11/23/2024: Enterobacter cloacae (Resistant to Cefazolin, Ceftazidime, Cefepime, Cefotaxime and Piperacillin/Tazobactam.  Intermediate to Ciprofloxacin.  Sensitive to Bactrim, Meropenem, Levofloxacin)  MRSA NS: pending  Urine culture: 7/23/2024: Enterobacter cloacae, Citrobacter koseri  Body fluid culture 11/26: few gram variable rods, GNR      Assessment:  Enterobacter cloacae bacteremia  Recurrent sepsis episodes:

## 2024-11-27 NOTE — PROGRESS NOTES
Mj Walden M.D.  Star Means D.O.  Regine Hernandez M.D.  Jenn Collier M.D.   Felix Galarza D.O.  Mike Lee M.D.           Daily Pulmonary Progress Note    Patient:  Estefany Levine 89 y.o. female MRN: [id number]     Date of Service: 11/27/2024        Subjective      Patient was seen and examined.    Left chest tube placed 11/26. Lung re-expanded.    Objective   Vitals: /60   Pulse 51   Temp 97.5 °F (36.4 °C) (Axillary)   Resp 16   Ht 1.524 m (5')   Wt 60 kg (132 lb 4.4 oz) Comment: changing out beds-CBW may be error  SpO2 96%   BMI 25.83 kg/m²     I/O:    Intake/Output Summary (Last 24 hours) at 11/27/2024 1437  Last data filed at 11/27/2024 1437  Gross per 24 hour   Intake --   Output 1700 ml   Net -1700 ml       CURRENT MEDS :  Scheduled Meds:   hydrocortisone sodium succinate PF  50 mg IntraVENous Q8H    sodium chloride flush  5-40 mL IntraVENous 2 times per day    lidocaine 1 % injection  50 mg IntraDERmal Once    meropenem (MERREM) 1,000 mg in sodium chloride (PF) 0.9 % 20 mL IV syringe  1,000 mg IntraVENous Q12H    sodium chloride (PF)  10 mL IntraVENous Q12H    sodium chloride flush  5-40 mL IntraVENous 2 times per day    enoxaparin  30 mg SubCUTAneous Daily    pantoprazole  40 mg Oral QAM AC       Continuous Infusions:   sodium chloride      sodium chloride      dextrose         PRN Meds:  sodium chloride flush, sodium chloride, sodium chloride flush, sodium chloride, potassium chloride **OR** potassium alternative oral replacement **OR** [DISCONTINUED] potassium chloride, magnesium sulfate, ondansetron **OR** ondansetron, polyethylene glycol, acetaminophen **OR** acetaminophen, glucose, dextrose bolus **OR** dextrose bolus, glucagon (rDNA), dextrose      Physical Exam:  Physical Exam  Constitutional:       General: She is not in acute distress.  HENT:      Head: Normocephalic and atraumatic.      Mouth/Throat:      Pharynx: No oropharyngeal exudate.   Eyes:       General: No scleral icterus.     Conjunctiva/sclera: Conjunctivae normal.   Neck:      Trachea: No tracheal deviation.   Cardiovascular:      Rate and Rhythm: Normal rate.      Heart sounds: Normal heart sounds.   Pulmonary:      Effort: Pulmonary effort is normal.      Breath sounds: Normal breath sounds.      Comments: +L chest tube, no leak  Abdominal:      Palpations: Abdomen is soft.      Tenderness: There is no abdominal tenderness.   Musculoskeletal:         General: No swelling or deformity.      Cervical back: Neck supple.   Lymphadenopathy:      Cervical: No cervical adenopathy.   Skin:     General: Skin is warm.      Findings: No rash.   Neurological:      General: No focal deficit present.      Mental Status: She is alert and oriented to person, place, and time.   Psychiatric:         Behavior: Behavior normal.         Pertinent/ New Labs and Imaging Studies     Pulmonary Function Testing personally reviewed and interpreted.    PERTINENT LAB RESULTS: Labs reviewed.      DIAGNOSTICS: Pertinent imaging reviewed.         Assessment:      Left pleural effusion, s/p thoracentesis 11/23/24 with 2.1 L removed, transudate, culture negative  Left pneumothorax - s/p chest tube  Acute hypoxic respiratory failure - improved  Sepsis secondary to UTI/hydronephrosis   Uterine cancer  UTI  Chronic pubic symphysis osteomyelitis, s/p colocutaneous fistula . Follows at [facility]  dementia      Plan:     Place chest tube to water seal  Follow chest imaging  Solucortef taper  Abx per ID      Thank you for allowing me to participate in the care of Estefany Levine.   Please feel free to call with questions.     Electronically signed by Felix Galarza DO on 11/27/2024 at 2:37 PM

## 2024-11-27 NOTE — PLAN OF CARE
Problem: Chronic Conditions and Co-morbidities  Goal: Patient's chronic conditions and co-morbidity symptoms are monitored and maintained or improved  11/27/2024 0204 by Celsa Granados RN  Outcome: Progressing  11/26/2024 2035 by Sujatha Lopez RN  Outcome: Progressing     Problem: Discharge Planning  Goal: Discharge to home or other facility with appropriate resources  11/27/2024 0204 by Celsa Granados RN  Outcome: Progressing  11/26/2024 2035 by Sujatha Lopez RN  Outcome: Progressing     Problem: Skin/Tissue Integrity  Goal: Absence of new skin breakdown  Description: 1.  Monitor for areas of redness and/or skin breakdown  2.  Assess vascular access sites hourly  3.  Every 4-6 hours minimum:  Change oxygen saturation probe site  4.  Every 4-6 hours:  If on nasal continuous positive airway pressure, respiratory therapy assess nares and determine need for appliance change or resting period.  11/26/2024 2035 by Sujatha Lopez RN  Outcome: Progressing     Problem: ABCDS Injury Assessment  Goal: Absence of physical injury  11/26/2024 2035 by Sujatha Lopez RN  Outcome: Progressing     Problem: Safety - Adult  Goal: Free from fall injury  11/26/2024 2035 by Sujatha Lopez RN  Outcome: Progressing     Problem: Pain  Goal: Verbalizes/displays adequate comfort level or baseline comfort level  11/27/2024 0204 by Celsa Granados RN  Outcome: Progressing  11/26/2024 2035 by Sujatha Lopez RN  Outcome: Progressing     Problem: Nutrition Deficit:  Goal: Optimize nutritional status  11/26/2024 2035 by Sujatha Lopez RN  Outcome: Progressing

## 2024-11-27 NOTE — PROGRESS NOTES
[facility]                                Progress note    Subjective:    Resting comfortably in bed  No acute complaints  Denies any pain    Objective:    /60   Pulse 51   Temp 97.5 °F (36.4 °C) (Axillary)   Resp 16   Ht 1.524 m (5')   Wt 60 kg (132 lb 4.4 oz) Comment: changing out beds-CBW may be error  SpO2 96%   BMI 25.83 kg/m²     In: -   Out: 1275   In: -   Out: 1275     General appearance: NAD, conversant  HEENT: AT/NC, MMM  Neck: FROM, supple  Lungs: Diminished, L CT   CV: RRR, no MRGs  Vasc: Radial pulses 2+  Abdomen: Soft, non-tender; no masses or HSM  Extremities: No peripheral edema or digital cyanosis  Skin: no rash, lesions or ulcers  Psych: Alert and oriented to person, place , intermittent confusion  Neuro: Alert and interactive     Recent Labs     11/25/24  0524 11/26/24  0702 11/27/24  0953   WBC 6.7 6.2 6.2   HGB 7.4* 8.7* 9.9*   HCT 27.2* 30.6* 34.3    187 228       Recent Labs     11/25/24  0524 11/26/24  0702 11/27/24  0953    142 141   K 3.4* 3.4* 3.6   * 111* 113*   CO2 18* 19* 20*   BUN 28* 30* 34*   CREATININE 1.5* 1.4* 1.5*   CALCIUM 8.1* 8.5* 8.8       Assessment:    Principal Problem:    Sepsis (HCC)  Active Problems:    Pleural effusion    Acute cystitis without hematuria    Severe protein-calorie malnutrition (HCC)  Resolved Problems:    * No resolved hospital problems. *      Plan:    Patient is an 89-year-old female admitted to [facility] for  Sepsis  -Monitor labs  -Resolution of leukocytosis, 6.7  -Remains on IV Zosyn  -MRI of the pelvis with osteomyelitis of the bilateral pubic rami  -IR consulted for drainage of fluid collection  -Follow cultures  -No further needs from surgery  -s/p left thoracentesis on 11/23 which appears to be transudative  -Continue IV Solu-Cortef 100 mg q8h   -Replace electrolytes as needed  -Weaned off of supplemental O2 satting well on room air  -Vital signs stable  -Await discharge plans from case

## 2024-11-27 NOTE — PROGRESS NOTES
Patient refusing morning blood work until she talks to her doctor.     5358 - Patients daughter Lore updated on patient refusing to cooperate with medications/bloodwork at this time.

## 2024-11-28 ENCOUNTER — APPOINTMENT (OUTPATIENT)
Dept: GENERAL RADIOLOGY | Age: 89
DRG: 871 | End: 2024-11-28
Attending: INTERNAL MEDICINE
Payer: MEDICARE

## 2024-11-28 LAB
ALBUMIN SERPL-MCNC: 2.1 G/DL (ref 3.5–5.2)
ALP SERPL-CCNC: 60 U/L (ref 35–104)
ALT SERPL-CCNC: 6 U/L (ref 0–32)
ANION GAP SERPL CALCULATED.3IONS-SCNC: 7 MMOL/L (ref 7–16)
AST SERPL-CCNC: 8 U/L (ref 0–31)
BASOPHILS # BLD: 0.01 K/UL (ref 0–0.2)
BASOPHILS NFR BLD: 0 % (ref 0–2)
BILIRUB SERPL-MCNC: <0.2 MG/DL (ref 0–1.2)
BUN SERPL-MCNC: 38 MG/DL (ref 6–23)
CALCIUM SERPL-MCNC: 8.6 MG/DL (ref 8.6–10.2)
CHLORIDE SERPL-SCNC: 113 MMOL/L (ref 98–107)
CO2 SERPL-SCNC: 20 MMOL/L (ref 22–29)
CREAT SERPL-MCNC: 1.4 MG/DL (ref 0.5–1)
EOSINOPHIL # BLD: 0.01 K/UL (ref 0.05–0.5)
EOSINOPHILS RELATIVE PERCENT: 0 % (ref 0–6)
ERYTHROCYTE [DISTWIDTH] IN BLOOD BY AUTOMATED COUNT: 18.4 % (ref 11.5–15)
GFR, ESTIMATED: 37 ML/MIN/1.73M2
GLUCOSE SERPL-MCNC: 105 MG/DL (ref 74–99)
HCT VFR BLD AUTO: 29.3 % (ref 34–48)
HGB BLD-MCNC: 8.5 G/DL (ref 11.5–15.5)
IMM GRANULOCYTES # BLD AUTO: 0.18 K/UL (ref 0–0.58)
IMM GRANULOCYTES NFR BLD: 3 % (ref 0–5)
LYMPHOCYTES NFR BLD: 1.85 K/UL (ref 1.5–4)
LYMPHOCYTES RELATIVE PERCENT: 26 % (ref 20–42)
MAGNESIUM SERPL-MCNC: 1.8 MG/DL (ref 1.6–2.6)
MCH RBC QN AUTO: 25.1 PG (ref 26–35)
MCHC RBC AUTO-ENTMCNC: 29 G/DL (ref 32–34.5)
MCV RBC AUTO: 86.7 FL (ref 80–99.9)
MICROORGANISM SPEC CULT: ABNORMAL
MICROORGANISM SPEC CULT: ABNORMAL
MICROORGANISM SPEC CULT: NORMAL
MONOCYTES NFR BLD: 0.26 K/UL (ref 0.1–0.95)
MONOCYTES NFR BLD: 4 % (ref 2–12)
NEUTROPHILS NFR BLD: 68 % (ref 43–80)
NEUTS SEG NFR BLD: 4.79 K/UL (ref 1.8–7.3)
PHOSPHATE SERPL-MCNC: 2.3 MG/DL (ref 2.5–4.5)
PLATELET # BLD AUTO: 192 K/UL (ref 130–450)
PMV BLD AUTO: 9.6 FL (ref 7–12)
POTASSIUM SERPL-SCNC: 3.6 MMOL/L (ref 3.5–5)
PROT SERPL-MCNC: 4.5 G/DL (ref 6.4–8.3)
RBC # BLD AUTO: 3.38 M/UL (ref 3.5–5.5)
SERVICE CMNT-IMP: ABNORMAL
SERVICE CMNT-IMP: NORMAL
SODIUM SERPL-SCNC: 140 MMOL/L (ref 132–146)
SPECIMEN DESCRIPTION: ABNORMAL
SPECIMEN DESCRIPTION: NORMAL
WBC OTHER # BLD: 7.1 K/UL (ref 4.5–11.5)

## 2024-11-28 PROCEDURE — 84100 ASSAY OF PHOSPHORUS: CPT

## 2024-11-28 PROCEDURE — 2060000000 HC ICU INTERMEDIATE R&B

## 2024-11-28 PROCEDURE — 83735 ASSAY OF MAGNESIUM: CPT

## 2024-11-28 PROCEDURE — 71045 X-RAY EXAM CHEST 1 VIEW: CPT

## 2024-11-28 PROCEDURE — 80053 COMPREHEN METABOLIC PANEL: CPT

## 2024-11-28 PROCEDURE — 6360000002 HC RX W HCPCS

## 2024-11-28 PROCEDURE — 2580000003 HC RX 258

## 2024-11-28 PROCEDURE — 6360000002 HC RX W HCPCS: Performed by: SPECIALIST

## 2024-11-28 PROCEDURE — 6370000000 HC RX 637 (ALT 250 FOR IP)

## 2024-11-28 PROCEDURE — 85025 COMPLETE CBC W/AUTO DIFF WBC: CPT

## 2024-11-28 PROCEDURE — 2580000003 HC RX 258: Performed by: SPECIALIST

## 2024-11-28 RX ADMIN — SODIUM CHLORIDE, PRESERVATIVE FREE 10 ML: 5 INJECTION INTRAVENOUS at 23:22

## 2024-11-28 RX ADMIN — SODIUM CHLORIDE, PRESERVATIVE FREE 10 ML: 5 INJECTION INTRAVENOUS at 09:33

## 2024-11-28 RX ADMIN — MEROPENEM 1000 MG: 1 INJECTION INTRAVENOUS at 06:05

## 2024-11-28 RX ADMIN — HYDROCORTISONE SODIUM SUCCINATE 50 MG: 100 INJECTION, POWDER, FOR SOLUTION INTRAMUSCULAR; INTRAVENOUS at 09:32

## 2024-11-28 RX ADMIN — PANTOPRAZOLE SODIUM 40 MG: 40 TABLET, DELAYED RELEASE ORAL at 06:07

## 2024-11-28 RX ADMIN — HYDROCORTISONE SODIUM SUCCINATE 50 MG: 100 INJECTION, POWDER, FOR SOLUTION INTRAMUSCULAR; INTRAVENOUS at 02:46

## 2024-11-28 RX ADMIN — HYDROCORTISONE SODIUM SUCCINATE 50 MG: 100 INJECTION, POWDER, FOR SOLUTION INTRAMUSCULAR; INTRAVENOUS at 17:53

## 2024-11-28 RX ADMIN — ENOXAPARIN SODIUM 30 MG: 100 INJECTION SUBCUTANEOUS at 09:32

## 2024-11-28 RX ADMIN — SODIUM CHLORIDE, PRESERVATIVE FREE 10 ML: 5 INJECTION INTRAVENOUS at 23:21

## 2024-11-28 RX ADMIN — MEROPENEM 1000 MG: 1 INJECTION INTRAVENOUS at 17:52

## 2024-11-28 NOTE — PROGRESS NOTES
[facility]                                Progress note    Subjective:    Resting comfortably in bed  No acute complaints  Denies any pain  Asking to eat breakfast    Objective:    BP (!) 132/59   Pulse 60   Temp 97.3 °F (36.3 °C) (Infrared)   Resp 16   Ht 1.524 m (5')   Wt 56 kg (123 lb 6.4 oz)   SpO2 97%   BMI 24.10 kg/m²     In: -   Out: 895   In: -   Out: 895     General appearance: NAD, conversant  HEENT: AT/NC, MMM  Neck: FROM, supple  Lungs: Diminished, L chest tube   CV: RRR, no MRGs  Vasc: Radial pulses 2+  Abdomen: Soft, non-tender; no masses or HSM  Extremities: No peripheral edema or digital cyanosis  Skin: no rash, lesions or ulcers  Psych: Alert and oriented to person, place , intermittent confusion  Neuro: Alert and interactive     Recent Labs     11/26/24  0702 11/27/24  0953 11/28/24  0439   WBC 6.2 6.2 7.1   HGB 8.7* 9.9* 8.5*   HCT 30.6* 34.3 29.3*    228 192       Recent Labs     11/26/24  0702 11/27/24  0953 11/28/24  0439    141 140   K 3.4* 3.6 3.6   * 113* 113*   CO2 19* 20* 20*   BUN 30* 34* 38*   CREATININE 1.4* 1.5* 1.4*   CALCIUM 8.5* 8.8 8.6       Assessment:    Principal Problem:    Sepsis (HCC)  Active Problems:    Pleural effusion    Acute cystitis without hematuria    Severe protein-calorie malnutrition (HCC)  Resolved Problems:    * No resolved hospital problems. *      Plan:    Patient is an 89-year-old female admitted to [facility] for  Sepsis  -Monitor labs  -Resolution of leukocytosis, 6.7  -Remains on IV Zosyn  -MRI of the pelvis with osteomyelitis of the bilateral pubic rami  -IR consulted for drainage of fluid collection  -Follow cultures  -No further needs from surgery  -s/p left thoracentesis on 11/23 which appears to be transudative  -Continue IV Solu-Cortef 100 mg q8h   -Replace electrolytes as needed  -Weaned off of supplemental O2 satting well on room air  -Vital signs stable  -Await discharge plans from case  management    11/26/24  -CXR: Moderate to large left pneumothorax  -Chest tube placed by pulmonology today  -IR procedure for drainage of abscess  -Follow cultures  -IV Zosyn switched to IV Merrem per infectious disease  -Begin weaning of Solu-Cortef 50 mg every 8 hours  -PICC line ordered by ID for ongoing antibiotics on discharge  -K+ 3.4, replace  -Kidney function remains about the same at 30/1.4  -Improvement in H&H, 8.7/30.6  -Continue to monitor labs and vitals    11/27/24  -Improving H&H, 9.9/24.3  -Follow cultures  -Await PICC line placement for ongoing antibiotics  -Continue IV Merrem  -Continue to wean Solu-Cortef  -Remains to be persistently bradycardic in the 40s to 50s, may need cardiology evaluation  -Saturating well on room air 96%  -Continue to monitor labs and vitals    11/28/24  -Kidney function remains about the same  -Remains on IV Merrem per infectious disease  -PICC line pending placement by IV team for ongoing antibiotics on discharge  -Continue to wean IV Solu-Cortef  -Improvement in bradycardia could be secondary to medications given for IR procedure, continue to monitor vitals  -Chest tube management per pulmonology  -Patient will return back to her nursing facility on discharge    Code status: full  Consultants: GS, ID and pulm      DVT Prophylaxis Lovenox  PT/OT  Discharge planning       MACK Cardenas - CNP  9:44 AM  11/28/2024

## 2024-11-28 NOTE — PROGRESS NOTES
Spiritual Health History and Assessment/Progress Note  [facility]    Spiritual/Emotional Needs,  ,  ,      Name: Estefany Levine MRN: [id number]    Age: 89 y.o.     Sex: female   Language: English   Muslim: None   Sepsis (HCC)     Date: 11/27/2024                           Spiritual Assessment began in [address] [facility]        Referral/Consult From: Rounding   Encounter Overview/Reason: Spiritual/Emotional Needs  Service Provided For: Patient    Giovanna, Belief, Meaning:   Patient unable to assess at this time  Family/Friends No family/friends present      Importance and Influence:  Patient unable to assess at this time  Family/Friends No family/friends present    Community:  Patient Other: sleeping  Family/Friends No family/friends present    Assessment and Plan of Care:     Patient Interventions include: Other: sleeping  Family/Friends Interventions include: No family/friends present    Patient Plan of Care: Spiritual Care available upon further referral  Family/Friends Plan of Care: No family/friends present    Electronically signed by Chaplain Juanita on 11/27/2024 at 7:37 PM

## 2024-11-28 NOTE — PROGRESS NOTES
Consult for PICC placement. Floor RN contacted IV Team for confirmation of order.  IV Team aware of order and since pt has good piv access, waiting until closer to discharge to place line.  Concerns for pt pulling out line once placed but we can place arm sleeve to help deter pt from pulling it out.     Electronically signed by Dania Charles RN on 11/28/2024 at 11:47 AM

## 2024-11-28 NOTE — PROGRESS NOTES
Infectious Disease  Progress Note  [facility]    Chief Complaint: sepsis    Subjective:   No new problems reported by nursing.  Seems to be tolerating antibiotics.  Denies any pain.    Scheduled Meds:   hydrocortisone sodium succinate PF  50 mg IntraVENous Q8H    sodium chloride flush  5-40 mL IntraVENous 2 times per day    lidocaine 1 % injection  50 mg IntraDERmal Once    meropenem (MERREM) 1,000 mg in sodium chloride (PF) 0.9 % 20 mL IV syringe  1,000 mg IntraVENous Q12H    sodium chloride (PF)  10 mL IntraVENous Q12H    sodium chloride flush  5-40 mL IntraVENous 2 times per day    enoxaparin  30 mg SubCUTAneous Daily    pantoprazole  40 mg Oral QAM AC     Continuous Infusions:   sodium chloride      sodium chloride      dextrose       PRN Meds:sodium chloride flush, sodium chloride, sodium chloride flush, sodium chloride, potassium chloride **OR** potassium alternative oral replacement **OR** [DISCONTINUED] potassium chloride, magnesium sulfate, ondansetron **OR** ondansetron, polyethylene glycol, acetaminophen **OR** acetaminophen, glucose, dextrose bolus **OR** dextrose bolus, glucagon (rDNA), dextrose    Prior to Admission medications    Medication Sig Start Date End Date Taking? Authorizing Provider   amLODIPine (NORVASC) 2.5 MG tablet Take 1 tablet by mouth daily Hold if SBP < 120   Yes ProviderDelfino MD   sodium chloride 0.9 % SOLN 100 mL with ferric gluconate 12.5 MG/ML SOLN 125 mg Infuse 125 mg intravenously every 7 days THURSDAYS 11/21/24 12/26/24 Yes ProviderDelfino MD   gabapentin (NEURONTIN) 100 MG capsule Take 1 capsule by mouth 2 times daily.   Yes ProviderDelfino MD   ondansetron (ZOFRAN-ODT) 4 MG disintegrating tablet Take 1 tablet by mouth every 8 hours as needed for Nausea or Vomiting   Yes ProviderDelfino MD   sodium bicarbonate 650 MG tablet Take 1 tablet by mouth 3 times daily   Yes ProviderDelfino MD   donepezil (ARICEPT) 10 MG tablet Take 1 tablet by mouth  incongruent with blood and urine culture  Complicated UTI with Enterobacter and Citrobacter  Chronic pubic symphysis osteomyelitis: /colocutaneous fistula with mesh exposed it was trimmed by surgery   Leukocytosis associated to the above  Left pleural effusion.  Underwent thoracentesis with a transudative fluid     Plan:    Continue Meropenem  Check final symphysis pubis cultures and sensitivity  Awaiting for PICC.  The PICC should be adequately wrapped so the patient does not attempt to pull it out    Spoke with nursing.    Rj Mckinney MD  11/28/2024  7:49 AM

## 2024-11-29 ENCOUNTER — APPOINTMENT (OUTPATIENT)
Dept: GENERAL RADIOLOGY | Age: 89
DRG: 871 | End: 2024-11-29
Attending: INTERNAL MEDICINE
Payer: MEDICARE

## 2024-11-29 LAB
ALBUMIN SERPL-MCNC: 2.2 G/DL (ref 3.5–5.2)
ALP SERPL-CCNC: 62 U/L (ref 35–104)
ALT SERPL-CCNC: 7 U/L (ref 0–32)
ANION GAP SERPL CALCULATED.3IONS-SCNC: 9 MMOL/L (ref 7–16)
AST SERPL-CCNC: 8 U/L (ref 0–31)
BASOPHILS # BLD: 0 K/UL (ref 0–0.2)
BASOPHILS NFR BLD: 0 % (ref 0–2)
BILIRUB SERPL-MCNC: <0.2 MG/DL (ref 0–1.2)
BUN SERPL-MCNC: 39 MG/DL (ref 6–23)
CALCIUM SERPL-MCNC: 8.9 MG/DL (ref 8.6–10.2)
CHLORIDE SERPL-SCNC: 111 MMOL/L (ref 98–107)
CO2 SERPL-SCNC: 20 MMOL/L (ref 22–29)
CREAT SERPL-MCNC: 1.3 MG/DL (ref 0.5–1)
EOSINOPHIL # BLD: 0.06 K/UL (ref 0.05–0.5)
EOSINOPHILS RELATIVE PERCENT: 1 % (ref 0–6)
ERYTHROCYTE [DISTWIDTH] IN BLOOD BY AUTOMATED COUNT: 18.6 % (ref 11.5–15)
GFR, ESTIMATED: 41 ML/MIN/1.73M2
GLUCOSE SERPL-MCNC: 99 MG/DL (ref 74–99)
HCT VFR BLD AUTO: 33.9 % (ref 34–48)
HGB BLD-MCNC: 9.7 G/DL (ref 11.5–15.5)
LYMPHOCYTES NFR BLD: 0.83 K/UL (ref 1.5–4)
LYMPHOCYTES RELATIVE PERCENT: 11 % (ref 20–42)
MAGNESIUM SERPL-MCNC: 1.9 MG/DL (ref 1.6–2.6)
MCH RBC QN AUTO: 25.6 PG (ref 26–35)
MCHC RBC AUTO-ENTMCNC: 28.6 G/DL (ref 32–34.5)
MCV RBC AUTO: 89.4 FL (ref 80–99.9)
METAMYELOCYTES ABSOLUTE COUNT: 0.06 K/UL (ref 0–0.12)
METAMYELOCYTES: 1 % (ref 0–1)
MICROORGANISM SPEC CULT: ABNORMAL
MICROORGANISM/AGENT SPEC: ABNORMAL
MONOCYTES NFR BLD: 0.26 K/UL (ref 0.1–0.95)
MONOCYTES NFR BLD: 4 % (ref 2–12)
NEUTROPHILS NFR BLD: 83 % (ref 43–80)
NEUTS SEG NFR BLD: 6.08 K/UL (ref 1.8–7.3)
PHOSPHATE SERPL-MCNC: 1.8 MG/DL (ref 2.5–4.5)
PLATELET # BLD AUTO: 192 K/UL (ref 130–450)
PMV BLD AUTO: 10.7 FL (ref 7–12)
POTASSIUM SERPL-SCNC: 3.7 MMOL/L (ref 3.5–5)
PROT SERPL-MCNC: 4.8 G/DL (ref 6.4–8.3)
RBC # BLD AUTO: 3.79 M/UL (ref 3.5–5.5)
RBC # BLD: ABNORMAL 10*6/UL
SERVICE CMNT-IMP: ABNORMAL
SODIUM SERPL-SCNC: 140 MMOL/L (ref 132–146)
SPECIMEN DESCRIPTION: ABNORMAL
SPECIMEN DESCRIPTION: ABNORMAL
WBC OTHER # BLD: 7.3 K/UL (ref 4.5–11.5)

## 2024-11-29 PROCEDURE — 84100 ASSAY OF PHOSPHORUS: CPT

## 2024-11-29 PROCEDURE — 6360000002 HC RX W HCPCS

## 2024-11-29 PROCEDURE — 2580000003 HC RX 258

## 2024-11-29 PROCEDURE — 6360000002 HC RX W HCPCS: Performed by: SPECIALIST

## 2024-11-29 PROCEDURE — 2060000000 HC ICU INTERMEDIATE R&B

## 2024-11-29 PROCEDURE — 71045 X-RAY EXAM CHEST 1 VIEW: CPT

## 2024-11-29 PROCEDURE — 6370000000 HC RX 637 (ALT 250 FOR IP)

## 2024-11-29 PROCEDURE — 2580000003 HC RX 258: Performed by: SPECIALIST

## 2024-11-29 PROCEDURE — 83735 ASSAY OF MAGNESIUM: CPT

## 2024-11-29 PROCEDURE — 80053 COMPREHEN METABOLIC PANEL: CPT

## 2024-11-29 PROCEDURE — 36415 COLL VENOUS BLD VENIPUNCTURE: CPT

## 2024-11-29 PROCEDURE — 85025 COMPLETE CBC W/AUTO DIFF WBC: CPT

## 2024-11-29 RX ADMIN — HYDROCORTISONE SODIUM SUCCINATE 50 MG: 100 INJECTION, POWDER, FOR SOLUTION INTRAMUSCULAR; INTRAVENOUS at 15:00

## 2024-11-29 RX ADMIN — SODIUM CHLORIDE, PRESERVATIVE FREE 10 ML: 5 INJECTION INTRAVENOUS at 18:11

## 2024-11-29 RX ADMIN — MEROPENEM 1000 MG: 1 INJECTION INTRAVENOUS at 18:11

## 2024-11-29 RX ADMIN — HYDROCORTISONE SODIUM SUCCINATE 50 MG: 100 INJECTION, POWDER, FOR SOLUTION INTRAMUSCULAR; INTRAVENOUS at 21:47

## 2024-11-29 RX ADMIN — MEROPENEM 1000 MG: 1 INJECTION INTRAVENOUS at 05:19

## 2024-11-29 RX ADMIN — HYDROCORTISONE SODIUM SUCCINATE 50 MG: 100 INJECTION, POWDER, FOR SOLUTION INTRAMUSCULAR; INTRAVENOUS at 05:19

## 2024-11-29 RX ADMIN — SODIUM CHLORIDE, PRESERVATIVE FREE 10 ML: 5 INJECTION INTRAVENOUS at 21:48

## 2024-11-29 RX ADMIN — PANTOPRAZOLE SODIUM 40 MG: 40 TABLET, DELAYED RELEASE ORAL at 05:20

## 2024-11-29 RX ADMIN — ENOXAPARIN SODIUM 30 MG: 100 INJECTION SUBCUTANEOUS at 11:02

## 2024-11-29 RX ADMIN — SODIUM CHLORIDE, PRESERVATIVE FREE 10 ML: 5 INJECTION INTRAVENOUS at 11:03

## 2024-11-29 RX ADMIN — SODIUM CHLORIDE, PRESERVATIVE FREE 10 ML: 5 INJECTION INTRAVENOUS at 05:21

## 2024-11-29 NOTE — PLAN OF CARE
Problem: Chronic Conditions and Co-morbidities  Goal: Patient's chronic conditions and co-morbidity symptoms are monitored and maintained or improved  11/28/2024 2143 by Marie Payan RN  Outcome: Progressing  11/28/2024 1607 by Bernardo Sow RN  Outcome: Progressing     Problem: Discharge Planning  Goal: Discharge to home or other facility with appropriate resources  11/28/2024 2143 by Marie Payan RN  Outcome: Progressing  11/28/2024 1607 by Bernardo Sow RN  Outcome: Progressing     Problem: Skin/Tissue Integrity  Goal: Absence of new skin breakdown  Description: 1.  Monitor for areas of redness and/or skin breakdown  2.  Assess vascular access sites hourly  3.  Every 4-6 hours minimum:  Change oxygen saturation probe site  4.  Every 4-6 hours:  If on nasal continuous positive airway pressure, respiratory therapy assess nares and determine need for appliance change or resting period.  11/28/2024 2143 by Marie Payan RN  Outcome: Progressing  11/28/2024 1607 by Bernardo Sow RN  Outcome: Progressing     Problem: ABCDS Injury Assessment  Goal: Absence of physical injury  Outcome: Progressing     Problem: Safety - Adult  Goal: Free from fall injury  Outcome: Progressing     Problem: Pain  Goal: Verbalizes/displays adequate comfort level or baseline comfort level  Outcome: Progressing     Problem: Nutrition Deficit:  Goal: Optimize nutritional status  Outcome: Progressing

## 2024-11-29 NOTE — PLAN OF CARE
Problem: Chronic Conditions and Co-morbidities  Goal: Patient's chronic conditions and co-morbidity symptoms are monitored and maintained or improved  Outcome: Not Progressing  Flowsheets (Taken 11/29/2024 0830)  Care Plan - Patient's Chronic Conditions and Co-Morbidity Symptoms are Monitored and Maintained or Improved: Monitor and assess patient's chronic conditions and comorbid symptoms for stability, deterioration, or improvement     Problem: Nutrition Deficit:  Goal: Optimize nutritional status  Outcome: Not Progressing  Flowsheets (Taken 11/29/2024 1435 by Kari Rodas RD, CNSC, LD)  Nutrient intake appropriate for improving, restoring, or maintaining nutritional needs:   Assess nutritional status and recommend course of action   Monitor oral intake, labs, and treatment plans   Recommend appropriate diets, oral nutritional supplements, and vitamin/mineral supplements     Problem: Musculoskeletal - Adult  Goal: Return mobility to safest level of function  Outcome: Not Progressing     Problem: Gastrointestinal - Adult  Goal: Maintains adequate nutritional intake  Outcome: Not Progressing

## 2024-11-29 NOTE — PROGRESS NOTES
Infectious Disease  Progress Note  [facility]    Chief Complaint: sepsis    Subjective:   No new problems reported by nursing.  Patient has no pain.  No dyspnea.  No diarrhea.    Scheduled Meds:   hydrocortisone sodium succinate PF  50 mg IntraVENous Q8H    sodium chloride flush  5-40 mL IntraVENous 2 times per day    lidocaine 1 % injection  50 mg IntraDERmal Once    meropenem (MERREM) 1,000 mg in sodium chloride (PF) 0.9 % 20 mL IV syringe  1,000 mg IntraVENous Q12H    sodium chloride (PF)  10 mL IntraVENous Q12H    sodium chloride flush  5-40 mL IntraVENous 2 times per day    enoxaparin  30 mg SubCUTAneous Daily    pantoprazole  40 mg Oral QAM AC     Continuous Infusions:   sodium chloride      sodium chloride      dextrose       PRN Meds:sodium chloride flush, sodium chloride, sodium chloride flush, sodium chloride, potassium chloride **OR** potassium alternative oral replacement **OR** [DISCONTINUED] potassium chloride, magnesium sulfate, ondansetron **OR** ondansetron, polyethylene glycol, acetaminophen **OR** acetaminophen, glucose, dextrose bolus **OR** dextrose bolus, glucagon (rDNA), dextrose    Prior to Admission medications    Medication Sig Start Date End Date Taking? Authorizing Provider   amLODIPine (NORVASC) 2.5 MG tablet Take 1 tablet by mouth daily Hold if SBP < 120   Yes ProviderDelfino MD   sodium chloride 0.9 % SOLN 100 mL with ferric gluconate 12.5 MG/ML SOLN 125 mg Infuse 125 mg intravenously every 7 days THURSDAYS 11/21/24 12/26/24 Yes ProviderDelfino MD   gabapentin (NEURONTIN) 100 MG capsule Take 1 capsule by mouth 2 times daily.   Yes ProviderDelfino MD   ondansetron (ZOFRAN-ODT) 4 MG disintegrating tablet Take 1 tablet by mouth every 8 hours as needed for Nausea or Vomiting   Yes ProviderDelfino MD   sodium bicarbonate 650 MG tablet Take 1 tablet by mouth 3 times daily   Yes ProviderDelfino MD   donepezil (ARICEPT) 10 MG tablet Take 1 tablet by mouth nightly     Provider, MD Delfino   memantine (NAMENDA) 5 MG tablet Take 1 tablet by mouth 2 times daily    Delfino Gonzalez MD   Multiple Vitamin (MULTIVITAMIN ADULT PO) Take by mouth    Delfino Gonzalez MD   valsartan (DIOVAN) 80 MG tablet Take 0.5 tablets by mouth daily    Delfino Gonzalez MD   Cholecalciferol (VITAMIN D-3 PO) Take 1,000 Int'l Units by mouth every 48 hours Takes one every other day    Delfino Gonzalez MD   metFORMIN (GLUCOPHAGE) 500 MG tablet Take 1 tablet by mouth daily (with breakfast)    Delfino Gonzalez MD        ROS:  As mentioned in subjective, all other systems negative      BP (!) 145/67   Pulse 60   Temp 97.4 °F (36.3 °C) (Axillary)   Resp 18   Ht 1.524 m (5')   Wt 56 kg (123 lb 6.4 oz)   SpO2 97%   BMI 24.10 kg/m²     Physical Exam  Constitutional: The patient is lying in bed.  She is awake and alert.  Pleasantly confused.  Cooperative.  Skin: Warm and dry. Small wound sacral area.  HEENT: Eyes show round, and reactive pupils. Moist mucous membranes, no ulcerations, no thrush.   Neck: Supple to movements.  Chest: Good breath sounds.  No crackles.  Left small bore chest tube with minimal serous fluid.  Cardiovascular: Heart sounds rhythmic and regular.  Abdomen: Soft, non tender. Open wound with colostomy. Puncture site dressed  Extremities: No edema.  Lines: Peripheral.    Radiology:  Reviewed     Microbiology:  Blood culture 11/23/2024: Enterobacter cloacae (Resistant to Cefazolin, Ceftazidime, Cefepime, Cefotaxime and Piperacillin/Tazobactam.  Intermediate to Ciprofloxacin.  Sensitive to Bactrim, Meropenem, Levofloxacin)  MRSA NS: pending  Urine culture: 7/23/2024: Enterobacter cloacae, Citrobacter koseri  Body fluid culture 11/26: Pansensitive E. coli, Proteus mirabilis (Resistant to Ciprofloxacin)     Assessment:  Enterobacter cloacae bacteremia  Symphysis pubis osteomyelitis with abscess.  Status post CT-guided drainage 11/26/2024.  Cultures growing E. coli

## 2024-11-29 NOTE — PROGRESS NOTES
[facility]                                Progress note    Subjective:    Patient resting comfortably in bed   No complaints. No family at bedside     Objective:    BP (!) 145/67   Pulse 60   Temp 97.4 °F (36.3 °C) (Axillary)   Resp 18   Ht 1.524 m (5')   Wt 56 kg (123 lb 6.4 oz)   SpO2 97%   BMI 24.10 kg/m²     In: -   Out: 635   In: -   Out: 635     General appearance: NAD, conversant  HEENT: AT/NC, MMM  Neck: FROM, supple  Lungs: Diminished, L chest tube   CV: RRR, no MRGs  Vasc: Radial pulses 2+  Abdomen: Soft, non-tender; no masses or HSM  Extremities: No peripheral edema or digital cyanosis  Skin: no rash, lesions or ulcers  Psych: Alert and oriented to person, place , intermittent confusion  Neuro: Alert and interactive     Recent Labs     11/27/24  0953 11/28/24  0439 11/29/24  0655   WBC 6.2 7.1 7.3   HGB 9.9* 8.5* 9.7*   HCT 34.3 29.3* 33.9*    192 192       Recent Labs     11/27/24  0953 11/28/24  0439 11/29/24  0655    140 140   K 3.6 3.6 3.7   * 113* 111*   CO2 20* 20* 20*   BUN 34* 38* 39*   CREATININE 1.5* 1.4* 1.3*   CALCIUM 8.8 8.6 8.9       Assessment:    Principal Problem:    Sepsis (HCC)  Active Problems:    Pleural effusion    Acute cystitis without hematuria    Severe protein-calorie malnutrition (HCC)  Resolved Problems:    * No resolved hospital problems. *      Plan:    Patient is an 89-year-old female admitted to [facility] for  Sepsis  -Monitor labs  -Resolution of leukocytosis, 6.7  -Remains on IV Zosyn  -MRI of the pelvis with osteomyelitis of the bilateral pubic rami  -IR consulted for drainage of fluid collection  -Follow cultures  -No further needs from surgery  -s/p left thoracentesis on 11/23 which appears to be transudative  -Continue IV Solu-Cortef 100 mg q8h   -Replace electrolytes as needed  -Weaned off of supplemental O2 satting well on room air  -Vital signs stable  -Await discharge plans from case management     11/26/24  -CXR:

## 2024-11-29 NOTE — PROGRESS NOTES
Mj Walden M.D.  Star Means D.O.  Regine Hernandez M.D.  Jenn Collier M.D.   Felix Galarza D.O.  Mike Lee M.D.           Daily Pulmonary Progress Note    Patient:  Estefany Levine 89 y.o. female MRN: [id number]     Date of Service: 11/29/2024        Subjective      Patient was seen and examined.  Sitting up in bed on RA   Denies dyspnea     Left chest tube placed 11/26. Lung re-expanded.  11/29 110cc out overnight   Objective   Vitals: BP (!) 145/67   Pulse 60   Temp 97.4 °F (36.3 °C) (Axillary)   Resp 18   Ht 1.524 m (5')   Wt 56 kg (123 lb 6.4 oz)   SpO2 97%   BMI 24.10 kg/m²     I/O:    Intake/Output Summary (Last 24 hours) at 11/29/2024 1343  Last data filed at 11/29/2024 0521  Gross per 24 hour   Intake --   Output 635 ml   Net -635 ml       CURRENT MEDS :  Scheduled Meds:   hydrocortisone sodium succinate PF  50 mg IntraVENous Q8H    sodium chloride flush  5-40 mL IntraVENous 2 times per day    lidocaine 1 % injection  50 mg IntraDERmal Once    meropenem (MERREM) 1,000 mg in sodium chloride (PF) 0.9 % 20 mL IV syringe  1,000 mg IntraVENous Q12H    sodium chloride (PF)  10 mL IntraVENous Q12H    sodium chloride flush  5-40 mL IntraVENous 2 times per day    enoxaparin  30 mg SubCUTAneous Daily    pantoprazole  40 mg Oral QAM AC       Continuous Infusions:   sodium chloride      sodium chloride      dextrose         PRN Meds:  sodium chloride flush, sodium chloride, sodium chloride flush, sodium chloride, potassium chloride **OR** potassium alternative oral replacement **OR** [DISCONTINUED] potassium chloride, magnesium sulfate, ondansetron **OR** ondansetron, polyethylene glycol, acetaminophen **OR** acetaminophen, glucose, dextrose bolus **OR** dextrose bolus, glucagon (rDNA), dextrose      Physical Exam:  Physical Exam  Constitutional:       General: She is not in acute distress.  HENT:      Head: Normocephalic and atraumatic.      Mouth/Throat:      Pharynx: No  pneumothorax.  2. Redemonstration of a left-sided chest tube  3. Stable hazy opacities in the lower lung fields which may indicate  bilateral pleural effusions with adjacent subsegmental atelectasis.          Assessment:      Left pleural effusion, s/p thoracentesis 11/23/24 with 2.1 L removed, transudate, culture negative  Left pneumothorax - s/p chest tube  Acute hypoxic respiratory failure - improved  Sepsis secondary to UTI/hydronephrosis   Uterine cancer  UTI  Chronic pubic symphysis osteomyelitis, s/p colocutaneous fistula . Follows at [facility]  dementia      Plan:     Left chest tube to water seal, 110cc out overnight , cytology + malignant cells-adenocarcinoma   Consult oncology    chest xray 11/29 improved no ptx   Solucortef taper  Abx per ID, merrem  x 6 weeks   Maintain POX >90%. O2 if needed , on RA   Pubic symphysis drained + fluid       Case discussed with Dr. Galarza     Electronically signed by MACK Estevez on 11/29/2024 at 1:43 PM    I personally saw, examined, and cared for the patient. I performed the substantive portion of the visit. Labs, medications, radiographs reviewed. I agree with history exam and plans detailed in NP note.    Will continue chest tube at this time as there is still output.    Felix Galarza, DO

## 2024-11-30 ENCOUNTER — APPOINTMENT (OUTPATIENT)
Dept: GENERAL RADIOLOGY | Age: 89
DRG: 871 | End: 2024-11-30
Attending: INTERNAL MEDICINE
Payer: MEDICARE

## 2024-11-30 LAB
ALBUMIN SERPL-MCNC: 2.3 G/DL (ref 3.5–5.2)
ALP SERPL-CCNC: 66 U/L (ref 35–104)
ALT SERPL-CCNC: 7 U/L (ref 0–32)
ANION GAP SERPL CALCULATED.3IONS-SCNC: 7 MMOL/L (ref 7–16)
AST SERPL-CCNC: 9 U/L (ref 0–31)
BASOPHILS # BLD: 0.01 K/UL (ref 0–0.2)
BASOPHILS NFR BLD: 0 % (ref 0–2)
BILIRUB SERPL-MCNC: 0.2 MG/DL (ref 0–1.2)
BUN SERPL-MCNC: 41 MG/DL (ref 6–23)
CALCIUM SERPL-MCNC: 9 MG/DL (ref 8.6–10.2)
CHLORIDE SERPL-SCNC: 111 MMOL/L (ref 98–107)
CO2 SERPL-SCNC: 22 MMOL/L (ref 22–29)
CREAT SERPL-MCNC: 1.1 MG/DL (ref 0.5–1)
EOSINOPHIL # BLD: 0.01 K/UL (ref 0.05–0.5)
EOSINOPHILS RELATIVE PERCENT: 0 % (ref 0–6)
ERYTHROCYTE [DISTWIDTH] IN BLOOD BY AUTOMATED COUNT: 18.6 % (ref 11.5–15)
GFR, ESTIMATED: 47 ML/MIN/1.73M2
GLUCOSE SERPL-MCNC: 144 MG/DL (ref 74–99)
HCT VFR BLD AUTO: 32.9 % (ref 34–48)
HGB BLD-MCNC: 9.8 G/DL (ref 11.5–15.5)
IMM GRANULOCYTES # BLD AUTO: 0.13 K/UL (ref 0–0.58)
IMM GRANULOCYTES NFR BLD: 2 % (ref 0–5)
LYMPHOCYTES NFR BLD: 1.22 K/UL (ref 1.5–4)
LYMPHOCYTES RELATIVE PERCENT: 17 % (ref 20–42)
MAGNESIUM SERPL-MCNC: 1.9 MG/DL (ref 1.6–2.6)
MCH RBC QN AUTO: 25.9 PG (ref 26–35)
MCHC RBC AUTO-ENTMCNC: 29.8 G/DL (ref 32–34.5)
MCV RBC AUTO: 87 FL (ref 80–99.9)
MONOCYTES NFR BLD: 0.21 K/UL (ref 0.1–0.95)
MONOCYTES NFR BLD: 3 % (ref 2–12)
NEUTROPHILS NFR BLD: 78 % (ref 43–80)
NEUTS SEG NFR BLD: 5.66 K/UL (ref 1.8–7.3)
PHOSPHATE SERPL-MCNC: 2 MG/DL (ref 2.5–4.5)
PLATELET # BLD AUTO: 224 K/UL (ref 130–450)
PMV BLD AUTO: 10 FL (ref 7–12)
POTASSIUM SERPL-SCNC: 3.5 MMOL/L (ref 3.5–5)
PROT SERPL-MCNC: 5 G/DL (ref 6.4–8.3)
RBC # BLD AUTO: 3.78 M/UL (ref 3.5–5.5)
SODIUM SERPL-SCNC: 140 MMOL/L (ref 132–146)
WBC OTHER # BLD: 7.2 K/UL (ref 4.5–11.5)

## 2024-11-30 PROCEDURE — 6370000000 HC RX 637 (ALT 250 FOR IP)

## 2024-11-30 PROCEDURE — 2580000003 HC RX 258: Performed by: SPECIALIST

## 2024-11-30 PROCEDURE — 6360000002 HC RX W HCPCS: Performed by: SPECIALIST

## 2024-11-30 PROCEDURE — 0W9B30Z DRAINAGE OF LEFT PLEURAL CAVITY WITH DRAINAGE DEVICE, PERCUTANEOUS APPROACH: ICD-10-PCS | Performed by: INTERNAL MEDICINE

## 2024-11-30 PROCEDURE — 80053 COMPREHEN METABOLIC PANEL: CPT

## 2024-11-30 PROCEDURE — 84100 ASSAY OF PHOSPHORUS: CPT

## 2024-11-30 PROCEDURE — 6360000002 HC RX W HCPCS

## 2024-11-30 PROCEDURE — 2580000003 HC RX 258

## 2024-11-30 PROCEDURE — 83735 ASSAY OF MAGNESIUM: CPT

## 2024-11-30 PROCEDURE — 36415 COLL VENOUS BLD VENIPUNCTURE: CPT

## 2024-11-30 PROCEDURE — 71045 X-RAY EXAM CHEST 1 VIEW: CPT

## 2024-11-30 PROCEDURE — 85025 COMPLETE CBC W/AUTO DIFF WBC: CPT

## 2024-11-30 PROCEDURE — 2060000000 HC ICU INTERMEDIATE R&B

## 2024-11-30 RX ORDER — HYDROCORTISONE SODIUM SUCCINATE 100 MG/2ML
50 INJECTION INTRAMUSCULAR; INTRAVENOUS 2 TIMES DAILY
Status: DISCONTINUED | OUTPATIENT
Start: 2024-11-30 | End: 2024-12-03

## 2024-11-30 RX ADMIN — SODIUM CHLORIDE, PRESERVATIVE FREE 10 ML: 5 INJECTION INTRAVENOUS at 05:40

## 2024-11-30 RX ADMIN — MEROPENEM 1000 MG: 1 INJECTION INTRAVENOUS at 06:54

## 2024-11-30 RX ADMIN — HYDROCORTISONE SODIUM SUCCINATE 50 MG: 100 INJECTION, POWDER, FOR SOLUTION INTRAMUSCULAR; INTRAVENOUS at 06:54

## 2024-11-30 RX ADMIN — SODIUM CHLORIDE, PRESERVATIVE FREE 10 ML: 5 INJECTION INTRAVENOUS at 09:28

## 2024-11-30 RX ADMIN — SODIUM CHLORIDE, PRESERVATIVE FREE 10 ML: 5 INJECTION INTRAVENOUS at 20:45

## 2024-11-30 RX ADMIN — HYDROCORTISONE SODIUM SUCCINATE 50 MG: 100 INJECTION, POWDER, FOR SOLUTION INTRAMUSCULAR; INTRAVENOUS at 20:44

## 2024-11-30 RX ADMIN — PANTOPRAZOLE SODIUM 40 MG: 40 TABLET, DELAYED RELEASE ORAL at 06:54

## 2024-11-30 RX ADMIN — SODIUM CHLORIDE, PRESERVATIVE FREE 10 ML: 5 INJECTION INTRAVENOUS at 09:29

## 2024-11-30 RX ADMIN — MEROPENEM 1000 MG: 1 INJECTION INTRAVENOUS at 17:25

## 2024-11-30 RX ADMIN — ENOXAPARIN SODIUM 30 MG: 100 INJECTION SUBCUTANEOUS at 09:28

## 2024-11-30 ASSESSMENT — PAIN SCALES - PAIN ASSESSMENT IN ADVANCED DEMENTIA (PAINAD)
TOTALSCORE: 0
CONSOLABILITY: NO NEED TO CONSOLE
FACIALEXPRESSION: SMILING OR INEXPRESSIVE
BODYLANGUAGE: RELAXED
BREATHING: NORMAL

## 2024-11-30 NOTE — PROGRESS NOTES
[facility]                                Progress note    Subjective:    No acute events or issues overnight  She is resting comfortably in bed  Denies shortness of breath nausea vomiting  No fevers or chills are reported    Objective:    BP (!) 126/59   Pulse 70   Temp 97.8 °F (36.6 °C) (Temporal)   Resp 16   Ht 1.524 m (5')   Wt 56 kg (123 lb 6.4 oz)   SpO2 96%   BMI 24.10 kg/m²     In: 360 [P.O.:360]  Out: 1650   In: 360   Out: 1650 [Urine:1050]    General appearance: NAD, conversant  HEENT: AT/NC, MMM  Neck: FROM, supple  Lungs: Diminished, L chest tube   CV: RRR, no MRGs  Vasc: Radial pulses 2+  Abdomen: Soft, non-tender; no masses or HSM  Extremities: No peripheral edema or digital cyanosis  Skin: no rash, lesions or ulcers  Psych: Alert and oriented to person, place , intermittent confusion  Neuro: Alert and interactive     Recent Labs     11/28/24  0439 11/29/24  0655 11/30/24  1020   WBC 7.1 7.3 7.2   HGB 8.5* 9.7* 9.8*   HCT 29.3* 33.9* 32.9*    192 224       Recent Labs     11/28/24  0439 11/29/24  0655 11/30/24  1020    140 140   K 3.6 3.7 3.5   * 111* 111*   CO2 20* 20* 22   BUN 38* 39* 41*   CREATININE 1.4* 1.3* 1.1*   CALCIUM 8.6 8.9 9.0       Assessment:    Principal Problem:    Sepsis (HCC)  Active Problems:    Pleural effusion    Acute cystitis without hematuria    Severe protein-calorie malnutrition (HCC)  Resolved Problems:    * No resolved hospital problems. *      Plan:    Patient is an 89-year-old female admitted to [facility] for  Sepsis  -Monitor labs  -Resolution of leukocytosis, 6.7  -Remains on IV Zosyn  -MRI of the pelvis with osteomyelitis of the bilateral pubic rami  -IR consulted for drainage of fluid collection  -Follow cultures  -No further needs from surgery  -s/p left thoracentesis on 11/23 which appears to be transudative  -Continue IV Solu-Cortef 100 mg q8h   -Replace electrolytes as needed  -Weaned off of supplemental O2 satting  antibiotic therapy     Code status: full  Consultants: GS, ID and pulm      DVT Prophylaxis Lovenox  PT/OT  Discharge planning         Caity Ann MD,  12:18 PM  11/30/2024

## 2024-11-30 NOTE — PROGRESS NOTES
Infectious Disease  Progress Note  [facility]    Chief Complaint: sepsis    Subjective:   No nausea vomiting or diarrhea.  Tolerating medications    Scheduled Meds:   hydrocortisone sodium succinate PF  50 mg IntraVENous BID    lidocaine 1 % injection  50 mg IntraDERmal Once    meropenem (MERREM) 1,000 mg in sodium chloride (PF) 0.9 % 20 mL IV syringe  1,000 mg IntraVENous Q12H    sodium chloride (PF)  10 mL IntraVENous Q12H    sodium chloride flush  5-40 mL IntraVENous 2 times per day    enoxaparin  30 mg SubCUTAneous Daily    pantoprazole  40 mg Oral QAM AC     Continuous Infusions:   sodium chloride      sodium chloride      dextrose       PRN Meds:sodium chloride flush, sodium chloride, sodium chloride flush, sodium chloride, potassium chloride **OR** potassium alternative oral replacement **OR** [DISCONTINUED] potassium chloride, magnesium sulfate, ondansetron **OR** ondansetron, polyethylene glycol, acetaminophen **OR** acetaminophen, glucose, dextrose bolus **OR** dextrose bolus, glucagon (rDNA), dextrose    Prior to Admission medications    Medication Sig Start Date End Date Taking? Authorizing Provider   amLODIPine (NORVASC) 2.5 MG tablet Take 1 tablet by mouth daily Hold if SBP < 120   Yes Delfino Gonzalez MD   sodium chloride 0.9 % SOLN 100 mL with ferric gluconate 12.5 MG/ML SOLN 125 mg Infuse 125 mg intravenously every 7 days THURSDAYS 11/21/24 12/26/24 Yes Delfino Gonzalez MD   gabapentin (NEURONTIN) 100 MG capsule Take 1 capsule by mouth 2 times daily.   Yes Delfino Gonzalez MD   ondansetron (ZOFRAN-ODT) 4 MG disintegrating tablet Take 1 tablet by mouth every 8 hours as needed for Nausea or Vomiting   Yes Delfino Gonzalez MD   sodium bicarbonate 650 MG tablet Take 1 tablet by mouth 3 times daily   Yes Delfino Goznalez MD   donepezil (ARICEPT) 10 MG tablet Take 1 tablet by mouth nightly    Delfino Gonzalez MD   memantine (NAMENDA) 5 MG tablet Take 1 tablet by mouth 2 times

## 2024-11-30 NOTE — PLAN OF CARE
Problem: Skin/Tissue Integrity  Goal: Absence of new skin breakdown  Description: 1.  Monitor for areas of redness and/or skin breakdown  2.  Assess vascular access sites hourly  3.  Every 4-6 hours minimum:  Change oxygen saturation probe site  4.  Every 4-6 hours:  If on nasal continuous positive airway pressure, respiratory therapy assess nares and determine need for appliance change or resting period.  11/30/2024 1241 by Savana Roth, RN  Outcome: Progressing  11/30/2024 0127 by Marie Payan, RN  Outcome: Progressing     Problem: Safety - Adult  Goal: Free from fall injury  11/30/2024 1241 by Savana Roth, RN  Outcome: Progressing  11/30/2024 0127 by Marie Payan, RN  Outcome: Progressing

## 2024-11-30 NOTE — PLAN OF CARE
Problem: Chronic Conditions and Co-morbidities  Goal: Patient's chronic conditions and co-morbidity symptoms are monitored and maintained or improved  11/30/2024 0127 by Marie Payan RN  Outcome: Progressing  11/29/2024 1531 by Michell Paredes RN  Outcome: Not Progressing  Flowsheets (Taken 11/29/2024 0830)  Care Plan - Patient's Chronic Conditions and Co-Morbidity Symptoms are Monitored and Maintained or Improved: Monitor and assess patient's chronic conditions and comorbid symptoms for stability, deterioration, or improvement     Problem: Discharge Planning  Goal: Discharge to home or other facility with appropriate resources  11/30/2024 0127 by Marie Payan RN  Outcome: Progressing  11/29/2024 1531 by Michell Paredes RN  Outcome: Progressing  Flowsheets (Taken 11/29/2024 0830)  Discharge to home or other facility with appropriate resources: Refer to discharge planning if patient needs post-hospital services based on physician order or complex needs related to functional status, cognitive ability or social support system     Problem: Skin/Tissue Integrity  Goal: Absence of new skin breakdown  Description: 1.  Monitor for areas of redness and/or skin breakdown  2.  Assess vascular access sites hourly  3.  Every 4-6 hours minimum:  Change oxygen saturation probe site  4.  Every 4-6 hours:  If on nasal continuous positive airway pressure, respiratory therapy assess nares and determine need for appliance change or resting period.  11/30/2024 0127 by Marie Payan RN  Outcome: Progressing  11/29/2024 1531 by Michell Paredes RN  Outcome: Progressing     Problem: ABCDS Injury Assessment  Goal: Absence of physical injury  11/30/2024 0127 by Marie Payan RN  Outcome: Progressing  11/29/2024 1531 by Michell Paredes RN  Outcome: Progressing     Problem: Safety - Adult  Goal: Free from fall injury  11/30/2024 0127 by Marie Payan RN  Outcome: Progressing  11/29/2024 1531 by

## 2024-11-30 NOTE — PROGRESS NOTES
Mj Walden M.D.  Star Means D.O.  Regine Hernandez M.D.  Jenn Collier M.D.   Felix Galarza D.O.  Mike Lee M.D.           Daily Pulmonary Progress Note    Patient:  Estefany Levine 89 y.o. female MRN: [id number]     Date of Service: 11/30/2024        Subjective      Patient was seen and examined resting in bed on room air. States she feels fine. Daughter present. Pleural fluid cytology results reviewed with her. Chest tube to water seal. 450 ml output yesterday.    Objective   Vitals: BP (!) 126/59   Pulse 70   Temp 97.8 °F (36.6 °C) (Temporal)   Resp 16   Ht 1.524 m (5')   Wt 56 kg (123 lb 6.4 oz)   SpO2 96%   BMI 24.10 kg/m²     I/O:    Intake/Output Summary (Last 24 hours) at 11/30/2024 1321  Last data filed at 11/30/2024 0401  Gross per 24 hour   Intake 120 ml   Output 1400 ml   Net -1280 ml       CURRENT MEDS :  Scheduled Meds:   hydrocortisone sodium succinate PF  50 mg IntraVENous BID    lidocaine 1 % injection  50 mg IntraDERmal Once    meropenem (MERREM) 1,000 mg in sodium chloride (PF) 0.9 % 20 mL IV syringe  1,000 mg IntraVENous Q12H    sodium chloride flush  5-40 mL IntraVENous 2 times per day    enoxaparin  30 mg SubCUTAneous Daily    pantoprazole  40 mg Oral QAM AC       Continuous Infusions:   sodium chloride      sodium chloride      dextrose         PRN Meds:  sodium chloride flush, sodium chloride, sodium chloride flush, sodium chloride, potassium chloride **OR** potassium alternative oral replacement **OR** [DISCONTINUED] potassium chloride, magnesium sulfate, ondansetron **OR** ondansetron, polyethylene glycol, acetaminophen **OR** acetaminophen, glucose, dextrose bolus **OR** dextrose bolus, glucagon (rDNA), dextrose      Physical Exam:  Physical Exam  Constitutional:       General: She is not in acute distress.  HENT:      Head: Normocephalic and atraumatic.      Mouth/Throat:      Pharynx: No oropharyngeal exudate.   Eyes:      General: No scleral icterus.    - 12.0 % 4   Eosinophils % 0 - 6 % 1   Basophils % 0.0 - 2.0 % 0   Neutrophils Absolute 1.80 - 7.30 k/uL 6.08   Lymphocytes Absolute 1.50 - 4.00 k/uL 0.83 (L)   Monocytes Absolute 0.10 - 0.95 k/uL 0.26   Eosinophils Absolute 0.05 - 0.50 k/uL 0.06   Basophils Absolute 0.00 - 0.20 k/uL 0.00   Metamyelocytes 0 - 1 % 1   RBC Morphology  1+ ANISOCYTOSIS  1+ BRIA CELLS  1+ OVALOCYTES  1+ POIKILOCYTOSIS   Metamyelocytes Absolute 0.00 - 0.12 k/uL 0.06   (H): Data is abnormally high  (L): Data is abnormally low     Latest Reference Range & Units 11/23/24 16:30   Clot Check  None Seen   Color, Fluid  Yellow   Appearance, Fluid  Clear   RBC, Fluid cells/uL 2,000   Glucose, Fluid mg/dL 156   LD, Fluid U/L 87   Monocyte Count, Fluid % 70   Neutrophil Count, Fluid % 30   Total Protein, Body Fluid g/dL 3.3   WBC, Fluid cells/uL 164     Collected: 11/23/24 0000   Lab status: Final   Resulting lab: [facility]   Value: Path Number: [id number]    INTERPRETATION    PLEURAL FLUID, LEFT:  Satisfactory for evaluation.  POSITIVE FOR MALIGNANCY.  Adenocarcinoma  Cellblock is paucicellular.    Comment: The clinical history of endometrial carcinoma is noted.  Immunostains are performed on the current specimen and stain the rare  malignant cells as follows: MOC31 positive, calretinin negative, TTF-1  positive, estrogen receptor negative.  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  The specimen is  paucicellular and insufficient for further molecular testing.  Intradepartmental consultation is obtained.        DIAGNOSTICS: Pertinent imaging reviewed.  Cxr  11/30:  FINDINGS:  The small bore left chest tube is stable in position.  There are no signs of  associated pneumothorax.  There is blunting of left costophrenic angle  suggestive of a tiny effusion.  There is improved left basilar atelectasis.  The contour of the mediastinum heart size are within the normal range.      IMPRESSION:  1. No signs of pneumothorax.  2. Improved left basilar atelectasis.          Assessment:      Left pleural effusion, s/p thoracentesis 11/23/24 with 2.1 L removed, transudate, culture negative  Left pneumothorax - s/p chest tube  Acute hypoxic respiratory failure - improved  Sepsis secondary to UTI/hydronephrosis   Uterine cancer  UTI  Chronic pubic symphysis osteomyelitis, s/p colocutaneous fistula . Follows at [facility]  dementia      Plan:     Clamped chest tube  Pleural fluid cytology + malignant cells-adenocarcinoma   Consult oncology    chest xray 11/30 improved no ptx   Decrease Solucortef to q12h today, stop Monday  Abx per ID, merrem  x 6 weeks   Maintain POX >90%. O2 if needed , on RA   Pubic symphysis drained + fluid   Daughter states she is going to incorporate  comfort measures with hospice services upon return to nursing facility.  Leave chest tube clamped. Orders placed for Pleurx catheter insertion in IR. Plans for patient to discharge with pleurx      This plan of care was reviewed in collaboration with Dr. Galarza    Electronically signed by MACK Garcia - CNP on 11/30/2024 at 1:21 PM    I personally saw, examined, and cared for the patient. I performed the substantive portion of the visit. Labs, medications, radiographs reviewed. I agree with history exam and plans detailed in NP note.    Given ongoing output, will have Pleurx placed.    Felix Galarza, DO

## 2024-12-01 ENCOUNTER — APPOINTMENT (OUTPATIENT)
Dept: GENERAL RADIOLOGY | Age: 89
DRG: 871 | End: 2024-12-01
Attending: INTERNAL MEDICINE
Payer: MEDICARE

## 2024-12-01 LAB
ALBUMIN SERPL-MCNC: 2.4 G/DL (ref 3.5–5.2)
ALP SERPL-CCNC: 79 U/L (ref 35–104)
ALT SERPL-CCNC: 8 U/L (ref 0–32)
ANION GAP SERPL CALCULATED.3IONS-SCNC: 6 MMOL/L (ref 7–16)
AST SERPL-CCNC: 10 U/L (ref 0–31)
BASOPHILS # BLD: 0.01 K/UL (ref 0–0.2)
BASOPHILS NFR BLD: 0 % (ref 0–2)
BILIRUB SERPL-MCNC: <0.2 MG/DL (ref 0–1.2)
BUN SERPL-MCNC: 34 MG/DL (ref 6–23)
CALCIUM SERPL-MCNC: 8.9 MG/DL (ref 8.6–10.2)
CHLORIDE SERPL-SCNC: 109 MMOL/L (ref 98–107)
CO2 SERPL-SCNC: 24 MMOL/L (ref 22–29)
CREAT SERPL-MCNC: 1 MG/DL (ref 0.5–1)
EOSINOPHIL # BLD: 0.13 K/UL (ref 0.05–0.5)
EOSINOPHILS RELATIVE PERCENT: 2 % (ref 0–6)
ERYTHROCYTE [DISTWIDTH] IN BLOOD BY AUTOMATED COUNT: 19.4 % (ref 11.5–15)
GFR, ESTIMATED: 54 ML/MIN/1.73M2
GLUCOSE SERPL-MCNC: 104 MG/DL (ref 74–99)
HCT VFR BLD AUTO: 36.8 % (ref 34–48)
HGB BLD-MCNC: 10.9 G/DL (ref 11.5–15.5)
IMM GRANULOCYTES # BLD AUTO: 0.12 K/UL (ref 0–0.58)
IMM GRANULOCYTES NFR BLD: 2 % (ref 0–5)
LYMPHOCYTES NFR BLD: 2.58 K/UL (ref 1.5–4)
LYMPHOCYTES RELATIVE PERCENT: 32 % (ref 20–42)
MAGNESIUM SERPL-MCNC: 1.8 MG/DL (ref 1.6–2.6)
MCH RBC QN AUTO: 25.9 PG (ref 26–35)
MCHC RBC AUTO-ENTMCNC: 29.6 G/DL (ref 32–34.5)
MCV RBC AUTO: 87.4 FL (ref 80–99.9)
MONOCYTES NFR BLD: 0.52 K/UL (ref 0.1–0.95)
MONOCYTES NFR BLD: 6 % (ref 2–12)
NEUTROPHILS NFR BLD: 59 % (ref 43–80)
NEUTS SEG NFR BLD: 4.78 K/UL (ref 1.8–7.3)
PHOSPHATE SERPL-MCNC: 1.4 MG/DL (ref 2.5–4.5)
PLATELET # BLD AUTO: 263 K/UL (ref 130–450)
PMV BLD AUTO: 10 FL (ref 7–12)
POTASSIUM SERPL-SCNC: 3.5 MMOL/L (ref 3.5–5)
PROT SERPL-MCNC: 4.9 G/DL (ref 6.4–8.3)
RBC # BLD AUTO: 4.21 M/UL (ref 3.5–5.5)
SODIUM SERPL-SCNC: 139 MMOL/L (ref 132–146)
WBC OTHER # BLD: 8.1 K/UL (ref 4.5–11.5)

## 2024-12-01 PROCEDURE — 80053 COMPREHEN METABOLIC PANEL: CPT

## 2024-12-01 PROCEDURE — 85025 COMPLETE CBC W/AUTO DIFF WBC: CPT

## 2024-12-01 PROCEDURE — 36415 COLL VENOUS BLD VENIPUNCTURE: CPT

## 2024-12-01 PROCEDURE — 2580000003 HC RX 258

## 2024-12-01 PROCEDURE — 71045 X-RAY EXAM CHEST 1 VIEW: CPT

## 2024-12-01 PROCEDURE — 83735 ASSAY OF MAGNESIUM: CPT

## 2024-12-01 PROCEDURE — 6360000002 HC RX W HCPCS

## 2024-12-01 PROCEDURE — 2580000003 HC RX 258: Performed by: SPECIALIST

## 2024-12-01 PROCEDURE — 2060000000 HC ICU INTERMEDIATE R&B

## 2024-12-01 PROCEDURE — 6370000000 HC RX 637 (ALT 250 FOR IP)

## 2024-12-01 PROCEDURE — 84100 ASSAY OF PHOSPHORUS: CPT

## 2024-12-01 PROCEDURE — 6360000002 HC RX W HCPCS: Performed by: SPECIALIST

## 2024-12-01 RX ADMIN — ENOXAPARIN SODIUM 30 MG: 100 INJECTION SUBCUTANEOUS at 08:01

## 2024-12-01 RX ADMIN — HYDROCORTISONE SODIUM SUCCINATE 50 MG: 100 INJECTION, POWDER, FOR SOLUTION INTRAMUSCULAR; INTRAVENOUS at 22:06

## 2024-12-01 RX ADMIN — SODIUM CHLORIDE, PRESERVATIVE FREE 10 ML: 5 INJECTION INTRAVENOUS at 22:06

## 2024-12-01 RX ADMIN — MEROPENEM 1000 MG: 1 INJECTION INTRAVENOUS at 17:09

## 2024-12-01 ASSESSMENT — PAIN SCALES - PAIN ASSESSMENT IN ADVANCED DEMENTIA (PAINAD)
CONSOLABILITY: NO NEED TO CONSOLE
CONSOLABILITY: NO NEED TO CONSOLE
TOTALSCORE: 0
BODYLANGUAGE: RELAXED
BREATHING: NORMAL
BODYLANGUAGE: RELAXED
TOTALSCORE: 0
FACIALEXPRESSION: SMILING OR INEXPRESSIVE
TOTALSCORE: 0
BODYLANGUAGE: RELAXED
BREATHING: NORMAL
CONSOLABILITY: NO NEED TO CONSOLE
FACIALEXPRESSION: SMILING OR INEXPRESSIVE
FACIALEXPRESSION: SMILING OR INEXPRESSIVE
BREATHING: NORMAL

## 2024-12-01 NOTE — PROGRESS NOTES
Pt assessed for PICC placement. Right arm not able to be used due to non compressible section of brachial vein in axillary area and no basilic or cephalic veins useable. Left arm has a brachial vein available but very close to axilla and appears to possibly narrow further into axillary area. Will have additional member of team assess again tomorrow. PIV placed with difficulty. Floor nurse aware.

## 2024-12-01 NOTE — PLAN OF CARE
Problem: Chronic Conditions and Co-morbidities  Goal: Patient's chronic conditions and co-morbidity symptoms are monitored and maintained or improved  12/1/2024 0242 by Sherly Worthy RN  Outcome: Not Progressing     Problem: Discharge Planning  Goal: Discharge to home or other facility with appropriate resources  12/1/2024 0242 by Sherly Worthy RN  Outcome: Not Progressing     Problem: Skin/Tissue Integrity  Goal: Absence of new skin breakdown  Description: 1.  Monitor for areas of redness and/or skin breakdown  2.  Assess vascular access sites hourly  3.  Every 4-6 hours minimum:  Change oxygen saturation probe site  4.  Every 4-6 hours:  If on nasal continuous positive airway pressure, respiratory therapy assess nares and determine need for appliance change or resting period.  12/1/2024 0242 by Sherly Worthy RN  Outcome: Not Progressing     Problem: ABCDS Injury Assessment  Goal: Absence of physical injury  12/1/2024 0242 by Sherly Worthy RN  Outcome: Not Progressing     Problem: Safety - Adult  Goal: Free from fall injury  12/1/2024 0242 by Sherly Worthy RN  Outcome: Not Progressing     Problem: Pain  Goal: Verbalizes/displays adequate comfort level or baseline comfort level  12/1/2024 0242 by Sherly Worthy RN  Outcome: Not Progressing     Problem: Nutrition Deficit:  Goal: Optimize nutritional status  12/1/2024 0242 by Sherly Worthy RN  Outcome: Not Progressing     Problem: Skin/Tissue Integrity - Adult  Goal: Incisions, wounds, or drain sites healing without S/S of infection  12/1/2024 0242 by Sherly Worthy RN  Outcome: Not Progressing     Problem: Musculoskeletal - Adult  Goal: Return mobility to safest level of function  12/1/2024 0242 by Sherly Worthy RN  Outcome: Not Progressing     Problem: Gastrointestinal - Adult  Goal: Maintains adequate nutritional intake  12/1/2024 0242 by Sherly Worthy RN  Outcome: Not Progressing     Problem: Metabolic/Fluid and Electrolytes -

## 2024-12-01 NOTE — PLAN OF CARE
Problem: Chronic Conditions and Co-morbidities  Goal: Patient's chronic conditions and co-morbidity symptoms are monitored and maintained or improved  12/1/2024 0903 by Kaela Onofre RN  Outcome: Progressing  12/1/2024 0242 by Sherly Worthy RN  Outcome: Not Progressing  12/1/2024 0242 by Sherly Worthy RN  Outcome: HH/HSPC Resolved Resume Next Certification Period     Problem: Discharge Planning  Goal: Discharge to home or other facility with appropriate resources  12/1/2024 0903 by Kaela Onofre RN  Outcome: Progressing  12/1/2024 0242 by Sherly Worthy RN  Outcome: Not Progressing  12/1/2024 0242 by Sherly Worthy RN  Outcome: HH/HSPC Resolved Resume Next Certification Period     Problem: Skin/Tissue Integrity  Goal: Absence of new skin breakdown  Description: 1.  Monitor for areas of redness and/or skin breakdown  2.  Assess vascular access sites hourly  3.  Every 4-6 hours minimum:  Change oxygen saturation probe site  4.  Every 4-6 hours:  If on nasal continuous positive airway pressure, respiratory therapy assess nares and determine need for appliance change or resting period.  12/1/2024 0903 by Kaela Onofre RN  Outcome: Progressing  12/1/2024 0242 by Sherly Worthy RN  Outcome: Not Progressing  12/1/2024 0242 by Sherly Worthy RN  Outcome: HH/HSPC Resolved Resume Next Certification Period     Problem: ABCDS Injury Assessment  Goal: Absence of physical injury  12/1/2024 0903 by Kaela Onofre RN  Outcome: Progressing  12/1/2024 0242 by Sherly Worthy RN  Outcome: Not Progressing  12/1/2024 0242 by Sherly Worthy RN  Outcome: HH/HSPC Resolved Resume Next Certification Period     Problem: Safety - Adult  Goal: Free from fall injury  12/1/2024 0903 by Kaela Onofre RN  Outcome: Progressing  12/1/2024 0242 by Sherly Worthy RN  Outcome: Not Progressing  12/1/2024 0242 by Sherly Worthy RN  Outcome: HH/HSPC Resolved Resume Next Certification Period

## 2024-12-01 NOTE — PROGRESS NOTES
[facility]                                Progress note    Subjective:    No acute events or issues overnight  She is resting comfortably in bed  Denies shortness of breath nausea vomiting  No fevers or chills are reported    Objective:    BP (!) 145/71   Pulse 88   Temp 98.2 °F (36.8 °C) (Temporal)   Resp 16   Ht 1.524 m (5')   Wt 56 kg (123 lb 6.4 oz)   SpO2 99%   BMI 24.10 kg/m²     In: -   Out: 500   In: -   Out: 500 [Urine:500]    General appearance: NAD, conversant, chronically ill-appearing  HEENT: AT/NC, MMM  Neck: FROM, supple  Lungs: Diminished, L chest tube   CV: RRR, no MRGs-dressing over left chest  Vasc: Radial pulses 2+  Abdomen: Soft, non-tender; no masses or HSM-ostomy in place with output liquid  Extremities: No peripheral edema or digital cyanosis  Skin: no rash, lesions or ulcers  Psych: Alert and oriented to person, place , intermittent confusion  Neuro: Alert and interactive     Recent Labs     11/29/24  0655 11/30/24  1020   WBC 7.3 7.2   HGB 9.7* 9.8*   HCT 33.9* 32.9*    224       Recent Labs     11/29/24  0655 11/30/24  1020    140   K 3.7 3.5   * 111*   CO2 20* 22   BUN 39* 41*   CREATININE 1.3* 1.1*   CALCIUM 8.9 9.0       Assessment:    Principal Problem:    Sepsis (HCC)  Active Problems:    Pleural effusion    Acute cystitis without hematuria    Severe protein-calorie malnutrition (HCC)  Resolved Problems:    * No resolved hospital problems. 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

## 2024-12-01 NOTE — PROGRESS NOTES
Infectious Disease  Progress Note  [facility]    Chief Complaint: sepsis    Subjective:   No nausea vomiting or diarrhea.  Tolerating medications, no apparent distress.    Scheduled Meds:   hydrocortisone sodium succinate PF  50 mg IntraVENous BID    lidocaine 1 % injection  50 mg IntraDERmal Once    meropenem (MERREM) 1,000 mg in sodium chloride (PF) 0.9 % 20 mL IV syringe  1,000 mg IntraVENous Q12H    sodium chloride flush  5-40 mL IntraVENous 2 times per day    enoxaparin  30 mg SubCUTAneous Daily    pantoprazole  40 mg Oral QAM AC     Continuous Infusions:   sodium chloride      sodium chloride      dextrose       PRN Meds:sodium chloride flush, sodium chloride, sodium chloride flush, sodium chloride, potassium chloride **OR** potassium alternative oral replacement **OR** [DISCONTINUED] potassium chloride, magnesium sulfate, ondansetron **OR** ondansetron, polyethylene glycol, acetaminophen **OR** acetaminophen, glucose, dextrose bolus **OR** dextrose bolus, glucagon (rDNA), dextrose    Prior to Admission medications    Medication Sig Start Date End Date Taking? Authorizing Provider   amLODIPine (NORVASC) 2.5 MG tablet Take 1 tablet by mouth daily Hold if SBP < 120   Yes Delfino Gonzalez MD   sodium chloride 0.9 % SOLN 100 mL with ferric gluconate 12.5 MG/ML SOLN 125 mg Infuse 125 mg intravenously every 7 days THURSDAYS 11/21/24 12/26/24 Yes Delfino Gonzalez MD   gabapentin (NEURONTIN) 100 MG capsule Take 1 capsule by mouth 2 times daily.   Yes Delfino Gonzalez MD   ondansetron (ZOFRAN-ODT) 4 MG disintegrating tablet Take 1 tablet by mouth every 8 hours as needed for Nausea or Vomiting   Yes Delfino Gonzalez MD   sodium bicarbonate 650 MG tablet Take 1 tablet by mouth 3 times daily   Yes Delfino Gonzalez MD   donepezil (ARICEPT) 10 MG tablet Take 1 tablet by mouth nightly    Delfino Gonzalez MD   memantine (NAMENDA) 5 MG tablet Take 1 tablet by mouth 2 times daily    Provider

## 2024-12-02 ENCOUNTER — APPOINTMENT (OUTPATIENT)
Dept: GENERAL RADIOLOGY | Age: 89
DRG: 871 | End: 2024-12-02
Attending: INTERNAL MEDICINE
Payer: MEDICARE

## 2024-12-02 LAB
ALBUMIN SERPL-MCNC: 2.1 G/DL (ref 3.5–5.2)
ALP SERPL-CCNC: 67 U/L (ref 35–104)
ALT SERPL-CCNC: 8 U/L (ref 0–32)
ANION GAP SERPL CALCULATED.3IONS-SCNC: 8 MMOL/L (ref 7–16)
AST SERPL-CCNC: 12 U/L (ref 0–31)
BASOPHILS # BLD: 0.01 K/UL (ref 0–0.2)
BASOPHILS NFR BLD: 0 % (ref 0–2)
BILIRUB SERPL-MCNC: <0.2 MG/DL (ref 0–1.2)
BUN SERPL-MCNC: 33 MG/DL (ref 6–23)
CALCIUM SERPL-MCNC: 8.7 MG/DL (ref 8.6–10.2)
CHLORIDE SERPL-SCNC: 113 MMOL/L (ref 98–107)
CO2 SERPL-SCNC: 21 MMOL/L (ref 22–29)
CREAT SERPL-MCNC: 0.9 MG/DL (ref 0.5–1)
EOSINOPHIL # BLD: 0.04 K/UL (ref 0.05–0.5)
EOSINOPHILS RELATIVE PERCENT: 1 % (ref 0–6)
ERYTHROCYTE [DISTWIDTH] IN BLOOD BY AUTOMATED COUNT: 19.3 % (ref 11.5–15)
GFR, ESTIMATED: 64 ML/MIN/1.73M2
GLUCOSE SERPL-MCNC: 100 MG/DL (ref 74–99)
HCT VFR BLD AUTO: 31.5 % (ref 34–48)
HGB BLD-MCNC: 9.4 G/DL (ref 11.5–15.5)
IMM GRANULOCYTES # BLD AUTO: 0.09 K/UL (ref 0–0.58)
IMM GRANULOCYTES NFR BLD: 2 % (ref 0–5)
LYMPHOCYTES NFR BLD: 1.58 K/UL (ref 1.5–4)
LYMPHOCYTES RELATIVE PERCENT: 26 % (ref 20–42)
MAGNESIUM SERPL-MCNC: 1.9 MG/DL (ref 1.6–2.6)
MCH RBC QN AUTO: 26 PG (ref 26–35)
MCHC RBC AUTO-ENTMCNC: 29.8 G/DL (ref 32–34.5)
MCV RBC AUTO: 87 FL (ref 80–99.9)
MONOCYTES NFR BLD: 0.25 K/UL (ref 0.1–0.95)
MONOCYTES NFR BLD: 4 % (ref 2–12)
NEUTROPHILS NFR BLD: 67 % (ref 43–80)
NEUTS SEG NFR BLD: 4.04 K/UL (ref 1.8–7.3)
PHOSPHATE SERPL-MCNC: 2.2 MG/DL (ref 2.5–4.5)
PLATELET # BLD AUTO: 230 K/UL (ref 130–450)
PMV BLD AUTO: 9.9 FL (ref 7–12)
POTASSIUM SERPL-SCNC: 3.8 MMOL/L (ref 3.5–5)
PROT SERPL-MCNC: 4.5 G/DL (ref 6.4–8.3)
RBC # BLD AUTO: 3.62 M/UL (ref 3.5–5.5)
SODIUM SERPL-SCNC: 142 MMOL/L (ref 132–146)
WBC OTHER # BLD: 6 K/UL (ref 4.5–11.5)

## 2024-12-02 PROCEDURE — 6360000002 HC RX W HCPCS: Performed by: SPECIALIST

## 2024-12-02 PROCEDURE — 84100 ASSAY OF PHOSPHORUS: CPT

## 2024-12-02 PROCEDURE — 80053 COMPREHEN METABOLIC PANEL: CPT

## 2024-12-02 PROCEDURE — 6370000000 HC RX 637 (ALT 250 FOR IP)

## 2024-12-02 PROCEDURE — 85025 COMPLETE CBC W/AUTO DIFF WBC: CPT

## 2024-12-02 PROCEDURE — 2580000003 HC RX 258

## 2024-12-02 PROCEDURE — 2580000003 HC RX 258: Performed by: SPECIALIST

## 2024-12-02 PROCEDURE — 6360000002 HC RX W HCPCS

## 2024-12-02 PROCEDURE — 2060000000 HC ICU INTERMEDIATE R&B

## 2024-12-02 PROCEDURE — 71045 X-RAY EXAM CHEST 1 VIEW: CPT

## 2024-12-02 PROCEDURE — 83735 ASSAY OF MAGNESIUM: CPT

## 2024-12-02 RX ORDER — ENOXAPARIN SODIUM 100 MG/ML
40 INJECTION SUBCUTANEOUS DAILY
Status: DISCONTINUED | OUTPATIENT
Start: 2024-12-02 | End: 2024-12-05 | Stop reason: HOSPADM

## 2024-12-02 RX ADMIN — HYDROCORTISONE SODIUM SUCCINATE 50 MG: 100 INJECTION, POWDER, FOR SOLUTION INTRAMUSCULAR; INTRAVENOUS at 08:43

## 2024-12-02 RX ADMIN — MEROPENEM 1000 MG: 1 INJECTION INTRAVENOUS at 13:18

## 2024-12-02 RX ADMIN — HYDROCORTISONE SODIUM SUCCINATE 50 MG: 100 INJECTION, POWDER, FOR SOLUTION INTRAMUSCULAR; INTRAVENOUS at 20:35

## 2024-12-02 RX ADMIN — MEROPENEM 1000 MG: 1 INJECTION INTRAVENOUS at 20:35

## 2024-12-02 RX ADMIN — MEROPENEM 1000 MG: 1 INJECTION INTRAVENOUS at 05:46

## 2024-12-02 RX ADMIN — SODIUM CHLORIDE, PRESERVATIVE FREE 10 ML: 5 INJECTION INTRAVENOUS at 08:43

## 2024-12-02 RX ADMIN — SODIUM CHLORIDE, PRESERVATIVE FREE 10 ML: 5 INJECTION INTRAVENOUS at 20:36

## 2024-12-02 RX ADMIN — PANTOPRAZOLE SODIUM 40 MG: 40 TABLET, DELAYED RELEASE ORAL at 05:55

## 2024-12-02 ASSESSMENT — PAIN SCALES - PAIN ASSESSMENT IN ADVANCED DEMENTIA (PAINAD)
FACIALEXPRESSION: SMILING OR INEXPRESSIVE
CONSOLABILITY: NO NEED TO CONSOLE
BODYLANGUAGE: RELAXED
TOTALSCORE: 0
BREATHING: NORMAL
CONSOLABILITY: NO NEED TO CONSOLE
BREATHING: NORMAL
TOTALSCORE: 0
BODYLANGUAGE: RELAXED
FACIALEXPRESSION: SMILING OR INEXPRESSIVE

## 2024-12-02 NOTE — PROGRESS NOTES
IR called to see if patient ate breakfast this am.  Informed IR patient had eaten one piece of moreno and no other food this morning and I did not have an NPO order.  They stated they could not do the procedure today, this nurse asked if the patient could be put on for later in the day for the pleurex catheter if kept NPO and they stated no, the patient would be done tomorrow.  This nurse also asked which procedures required NPO prior to and they said all procedures except a thoracentesis or paracentesis.  Patient made NPO after MN.  HG

## 2024-12-02 NOTE — PROGRESS NOTES
DVT Prophylaxis Adjustment Policy (DVT Prophylaxis)     This patient is on DVT Prophylaxis medication that requires a dose adjustment      Date Body Weight IBW  Adjusted BW SCr  CrCl Dialysis status   12/2/2024 56 kg (123 lb 6.4 oz) Ideal body weight: 45.5 kg (100 lb 4.9 oz)  Adjusted ideal body weight: 49.7 kg (109 lb 8.7 oz) Serum creatinine: 1 mg/dL 12/01/24 1700  Estimated creatinine clearance: 31 mL/min N/a       Pharmacy has dose-adjusted the DVT Prophylaxis regimen to match   the recommendations from the following table        Ordered Medication:Lovenox 30mg daily    Order Changed/converted to: Lovenox 40mg daily      These changes were made per protocol according to the [facility] Pharmacist   Review for Appropriate Use and Automatic Dose Adjustments of   Subcutaneous Anticoagulants Policy     *Please note this dose may need readjusted if patient's condition changes.    Please contact pharmacy with any questions regarding these changes.    john singletary RPH  12/2/2024  6:23 AM

## 2024-12-02 NOTE — PROGRESS NOTES
Mj Walden M.D.,[facility]  Star eMans D.O., FSLY., [facility]  Regine Hernandez M.D.  Jenn Collier M.D.   Felix Galarza D.O.  Mike Lee M.D.         Daily Pulmonary Progress Note    Patient:  Estefany Levine 89 y.o. female MRN: [id number]            Synopsis     We are following patient for pleural effusion    \"CC\" hypoxia    Code status: Full      Subjective      Patient was seen and examined.  Chest tube remains clamped, plan for IR to insert left Pleurx chest tube for malignant effusion when able.  Tentative schedule for tomorrow n.p.o. after midnight.  Remains off oxygen.  Sitting up eating lunch.      Review of Systems:  Constitutional: Denies fever, weight loss, night sweats, and fatigue  Skin: Denies pigmentation, dark lesions, and rashes   HEENT: Denies hearing loss, tinnitus, ear drainage, epistaxis, sore throat, and hoarseness.  Cardiovascular: Denies palpitations, chest pain, and chest pressure.  Respiratory: Denies cough, dyspnea at rest, hemoptysis, apnea, and choking.  Gastrointestinal: Denies nausea, vomiting, poor appetite, diarrhea, heartburn or reflux  Genitourinary: Denies dysuria, frequency, urgency or hematuria  Musculoskeletal: Denies myalgias, muscle weakness, and bone pain  Neurological: Denies dizziness, vertigo, headache, and focal weakness  Psychological: Denies anxiety and depression  Endocrine: Denies heat intolerance and cold intolerance  Hematopoietic/Lymphatic: Denies bleeding problems and blood transfusions    24-hour events:  None    Objective   OBJECTIVE:   /68   Pulse 73   Temp 97.7 °F (36.5 °C) (Infrared)   Resp 18   Ht 1.524 m (5')   Wt 56 kg (123 lb 6.4 oz)   SpO2 99%   BMI 24.10 kg/m²   SpO2 Readings from Last 1 Encounters:   12/02/24 99%        I/O:  No intake or output data in the 24 hours ending 12/02/24 1022                   CURRENT MEDS :  Scheduled Meds:   enoxaparin  40 mg SubCUTAneous Daily    hydrocortisone sodium succinate PF  evaluation.  POSITIVE FOR MALIGNANCY.  Adenocarcinoma  Cellblock is paucicellular.     Pleural fluid analysis 11/23/2024   Latest Reference Range & Units 11/23/24 16:30   Appearance, Fluid  Clear   Color, Fluid  Yellow   RBC, Fluid cells/uL 2,000   Glucose, Fluid mg/dL 156   LD, Fluid U/L 87   Monocyte Count, Fluid % 70   Neutrophil Count, Fluid % 30   Total Protein, Body Fluid g/dL 3.3   WBC, Fluid cells/uL 164   Clot Check  None Seen         Micro:  No results for input(s): \"CULTRESP\" in the last 72 hours.  No results for input(s): \"LABGRAM\" in the last 72 hours.  No results for input(s): \"LEGUR\" in the last 72 hours.  No results for input(s): \"STREPNEUMAGU\" in the last 72 hours.  No results for input(s): \"LP1UAG\" in the last 72 hours.  Specimen Description .DRAIN pubic symphysis [facility]   Special Requests Site: Body Fluid [facility]   Direct Exam Gram stain performed on unspun fluid. [facility]    MODERATE EPITHELIAL CELLS [facility]    MODERATE Polymorphonuclear leukocytes  - [facility]    MANY GRAM NEGATIVE RODS Abnormal  [facility]    MODERATE GRAM POSITIVE COCCI IN PAIRS Abnormal  [facility]    FEW GRAM POSITIVE COCCI IN CHAINS Abnormal  [facility]    FEW GRAM VARIABLE RODS Abnormal  [facility]   Culture ESCHERICHIA COLI HEAVY GROWTH Abnormal  [facility]    PROTEUS MIRABILIS MODERATE GROWTH Abnormal  [facility]    Contains abnormal data Culture, Blood 1  Order: [id number]  Status: Final result       Visible to patient: No (not released)       Next appt: None    Specimen Information: Blood   0 Result Notes       Component  Resulting Agency   Specimen Description .BLOOD [facility]   Special Requests Site: Blood [facility]

## 2024-12-02 NOTE — PROGRESS NOTES
Physician Progress Note      PATIENT:               NAMAN STOKES  [facility] #:                  [id number]  :                       1935  ADMIT DATE:       2024 10:25 PM  DISCH DATE:  RESPONDING  PROVIDER #:        Caity Ann MD          QUERY TEXT:    Patient admitted with Sepsis probable UTI source. Noted documentation of acute   hypoxic respiratory failure per H&P with physical exam documentation of not   in apparent acute distress, breathing easy, lungs no increased work of   breathing, no wheezes. In order to support the diagnosis of acute hypoxic   respiratory failure, please include additional clinical indicators in your   documentation.  Or please document if the diagnosis of acute hypoxic   respiratory failure has been ruled out after further study.    The medical record reflects the following:  Risk Factors: Sepsis; Large left pleural effusion  Clinical Indicators:  24: Per H&P:  acute hypoxic respiratory failure.  Constitutional: not in   apparent acute distress, breathing easy, lungs no increased work of   breathing, no wheezes  24: Critical Care:  Acute hypoxic respiratory failure 2/2 large pleural   effusion.  Physical exam: no increased work of breathing, good air exchange on   RA, clear to auscultation on the fight. no crackles or wheezes.  24: per vital sign flowsheet: respiratory rate 21-24 Pulse ox 94- 98% on   RA  24: per vital sign flowsheet: respiratory rate 12-21;  Pulse ox: 95% on   2 liter; and 91-96% on RA  24: per vital sign flowsheet: respiratory rate 13-22; Pulse ox 92-97% on   RA  Treatment: critical care consult; oxygen per nasal cannula;  Options provided:  -- Acute Hypoxic Respiratory Failure as evidenced by, Please document   evidence.  -- Acute Respiratory Hypoxic Failure ruled out after study  -- Other - I will add my own diagnosis  -- Disagree - Not applicable / Not valid  -- Disagree - Clinically unable to determine / Unknown  --

## 2024-12-02 NOTE — PLAN OF CARE
Problem: Skin/Tissue Integrity  Goal: Absence of new skin breakdown  Description: 1.  Monitor for areas of redness and/or skin breakdown  2.  Assess vascular access sites hourly  3.  Every 4-6 hours minimum:  Change oxygen saturation probe site  4.  Every 4-6 hours:  If on nasal continuous positive airway pressure, respiratory therapy assess nares and determine need for appliance change or resting period.  12/2/2024 1009 by Savana Roth, RN  Outcome: Progressing  12/2/2024 0248 by Jaimee King RN  Outcome: Progressing     Problem: Safety - Adult  Goal: Free from fall injury  12/2/2024 1009 by Savana Roth, RN  Outcome: Progressing  12/2/2024 0248 by Jaimee King RN  Outcome: Progressing     Problem: Pain  Goal: Verbalizes/displays adequate comfort level or baseline comfort level  12/2/2024 0248 by Jaimee King RN  Outcome: Progressing

## 2024-12-02 NOTE — PROGRESS NOTES
[facility]                                Progress note    Subjective:    No acute events or issues overnight  Resting comfortably in bed  Has no acute complaints  Confused    Objective:    BP (!) 164/73   Pulse 64   Temp 97.5 °F (36.4 °C) (Temporal)   Resp 18   Ht 1.524 m (5')   Wt 56 kg (123 lb 6.4 oz)   SpO2 98%   BMI 24.10 kg/m²     No intake/output data recorded.  No intake/output data recorded.    General appearance: NAD, conversant, chronically ill-appearing  HEENT: AT/NC, MMM  Neck: FROM, supple  Lungs: Diminished, L chest tube   CV: RRR, no MRGs-dressing over left chest  Vasc: Radial pulses 2+  Abdomen: Soft, non-tender; no masses or HSM-ostomy in place with output liquid  Extremities: No peripheral edema or digital cyanosis  Skin: no rash, lesions or ulcers  Psych: Alert and oriented to person, place , intermittent confusion  Neuro: Alert and interactive     Recent Labs     11/30/24  1020 12/01/24  1700 12/02/24  0555   WBC 7.2 8.1 6.0   HGB 9.8* 10.9* 9.4*   HCT 32.9* 36.8 31.5*    263 230       Recent Labs     11/30/24  1020 12/01/24  1700 12/02/24  0555    139 142   K 3.5 3.5 3.8   * 109* 113*   CO2 22 24 21*   BUN 41* 34* 33*   CREATININE 1.1* 1.0 0.9   CALCIUM 9.0 8.9 8.7       Assessment:    Principal Problem:    Sepsis (HCC)  Active Problems:    Pleural effusion    Acute cystitis without hematuria    Severe protein-calorie malnutrition (HCC)  Resolved Problems:    * No resolved hospital problems. 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 labs for discharge plan    12/2/24  -Pleurx catheter to be placed tomorrow in IR per pulmonology  -N.p.o. at midnight  -Solu-Cortef management per pulmonology  -Awaiting PICC line placement  -Continue IV Merrem  -Labs and vital stable  -Discharge planning back to her nursing facility once medically stable     Code status: full  Consultants: GS, ID and pulm      DVT Prophylaxis Lovenox  PT/OT  Discharge planning         MACK Cardenas - CNP  1:18 PM  12/2/2024

## 2024-12-02 NOTE — PLAN OF CARE
Problem: Chronic Conditions and Co-morbidities  Goal: Patient's chronic conditions and co-morbidity symptoms are monitored and maintained or improved  Outcome: Progressing     Problem: Discharge Planning  Goal: Discharge to home or other facility with appropriate resources  Outcome: Progressing     Problem: Skin/Tissue Integrity  Goal: Absence of new skin breakdown  Description: 1.  Monitor for areas of redness and/or skin breakdown  2.  Assess vascular access sites hourly  3.  Every 4-6 hours minimum:  Change oxygen saturation probe site  4.  Every 4-6 hours:  If on nasal continuous positive airway pressure, respiratory therapy assess nares and determine need for appliance change or resting period.  Outcome: Progressing     Problem: ABCDS Injury Assessment  Goal: Absence of physical injury  Outcome: Progressing     Problem: Safety - Adult  Goal: Free from fall injury  Outcome: Progressing     Problem: Pain  Goal: Verbalizes/displays adequate comfort level or baseline comfort level  Outcome: Progressing     Problem: Nutrition Deficit:  Goal: Optimize nutritional status  Outcome: Progressing     Problem: Skin/Tissue Integrity - Adult  Goal: Incisions, wounds, or drain sites healing without S/S of infection  Outcome: Progressing     Problem: Musculoskeletal - Adult  Goal: Return mobility to safest level of function  Outcome: Progressing     Problem: Gastrointestinal - Adult  Goal: Maintains adequate nutritional intake  Outcome: Progressing     Problem: Metabolic/Fluid and Electrolytes - Adult  Goal: Hemodynamic stability and optimal renal function maintained  Outcome: Progressing

## 2024-12-02 NOTE — CARE COORDINATION
Spoke with Savana, facility liaison for [facility]. Facility continues to follow patient and is willing to accept her for care at time of hospital discharge acknowledging patient will have PICC, IV atb and pleurex cath.  Patient will need followed and assisted with discharge planning as necessary.   Can Grant, MSN RN  [facility] Case Management  [phone]

## 2024-12-02 NOTE — PROGRESS NOTES
Consulted to place PICC. IV team assessed yesterday and I agree there are non compressible veins in right arm and only measure 0.22 cm in the right brachial vein in the arm pit. Left arm assessed and cephalic vein too small to measure and brachial vein 0.22 cm in the arm pit. Would need at least a 0.44 cm vein to attempt a single lumen PICC. This patient is not a candidate for a PICC/MIDLINE.Charge RN notified.12/2/2024 2:21 PM DUC VASQUEZ, [facility]

## 2024-12-02 NOTE — PROGRESS NOTES
Called and spoke to the nurse re: IR request for pleurx cath. Floor nurse states that the patient ate breakfast. Informed b/c of this the procedure would need rescheduled for 12/3. Informed that orders would need to be obtained for the patient to be NPO except meds at midnight, hold thinners.

## 2024-12-02 NOTE — PROGRESS NOTES
Infectious Disease  Progress Note  [facility]    Chief Complaint: sepsis    Subjective:   The patient has no new complaints.  Tolerating antibiotic.  No fever.    Scheduled Meds:   enoxaparin  40 mg SubCUTAneous Daily    hydrocortisone sodium succinate PF  50 mg IntraVENous BID    lidocaine 1 % injection  50 mg IntraDERmal Once    meropenem (MERREM) 1,000 mg in sodium chloride (PF) 0.9 % 20 mL IV syringe  1,000 mg IntraVENous Q12H    sodium chloride flush  5-40 mL IntraVENous 2 times per day    pantoprazole  40 mg Oral QAM AC     Continuous Infusions:   sodium chloride      sodium chloride      dextrose       PRN Meds:sodium chloride flush, sodium chloride, sodium chloride flush, sodium chloride, potassium chloride **OR** potassium alternative oral replacement **OR** [DISCONTINUED] potassium chloride, magnesium sulfate, ondansetron **OR** ondansetron, polyethylene glycol, acetaminophen **OR** acetaminophen, glucose, dextrose bolus **OR** dextrose bolus, glucagon (rDNA), dextrose    Prior to Admission medications    Medication Sig Start Date End Date Taking? Authorizing Provider   amLODIPine (NORVASC) 2.5 MG tablet Take 1 tablet by mouth daily Hold if SBP < 120   Yes Delfino Gonzalez MD   sodium chloride 0.9 % SOLN 100 mL with ferric gluconate 12.5 MG/ML SOLN 125 mg Infuse 125 mg intravenously every 7 days THURSDAYS 11/21/24 12/26/24 Yes Delfino Gonzalez MD   gabapentin (NEURONTIN) 100 MG capsule Take 1 capsule by mouth 2 times daily.   Yes Delfino Gonzalez MD   ondansetron (ZOFRAN-ODT) 4 MG disintegrating tablet Take 1 tablet by mouth every 8 hours as needed for Nausea or Vomiting   Yes Delfino Gonzalez MD   sodium bicarbonate 650 MG tablet Take 1 tablet by mouth 3 times daily   Yes Delfino Gonzalez MD   donepezil (ARICEPT) 10 MG tablet Take 1 tablet by mouth nightly    Delfino Gonzalez MD   memantine (NAMENDA) 5 MG tablet Take 1 tablet by mouth 2 times daily    Delfino Gonzalez MD    Multiple Vitamin (MULTIVITAMIN ADULT PO) Take by mouth    Delfino Gonzalez MD   valsartan (DIOVAN) 80 MG tablet Take 0.5 tablets by mouth daily    Delfino Gonzalez MD   Cholecalciferol (VITAMIN D-3 PO) Take 1,000 Int'l Units by mouth every 48 hours Takes one every other day    Delfino Gonzalez MD   metFORMIN (GLUCOPHAGE) 500 MG tablet Take 1 tablet by mouth daily (with breakfast)    Delfino Gonzalez MD        ROS:  As mentioned in subjective, all other systems negative      /68   Pulse 73   Temp 97.7 °F (36.5 °C) (Infrared)   Resp 18   Ht 1.524 m (5')   Wt 56 kg (123 lb 6.4 oz)   SpO2 99%   BMI 24.10 kg/m²     Physical Exam  Constitutional: The patient is lying in bed.  Pleasantly confused no distress.  TeleSitter in room.  Skin: Warm and dry. Small wound sacral area.  HEENT: Eyes show round, and reactive pupils. Moist mucous membranes, no ulcerations, no thrush.   Neck: Supple to movements.  Chest: Good breath sounds.  No crackles.  Left small bore chest tube - dry sressing  Cardiovascular: Heart sounds rhythmic and regular.  Abdomen: Soft, non tender.  wound with colostomy. Puncture site dressed--not removed  Extremities: No edema.  Lines: Peripheral.    Radiology:  Reviewed     Microbiology:  Blood culture 11/23/2024: Enterobacter cloacae (Resistant to Cefazolin, Ceftazidime, Cefepime, Cefotaxime and Piperacillin/Tazobactam.  Intermediate to Ciprofloxacin.  Sensitive to Bactrim, Meropenem, Levofloxacin)  MRSA NS: pending  Urine culture: 7/23/2024: Enterobacter cloacae, Citrobacter koseri  Body fluid culture 11/26: Pansensitive E. coli, Proteus mirabilis (Resistant to Ciprofloxacin).  Anaerobic GNR, GPC, diphtheroids    Assessment:  Enterobacter cloacae bacteremia  Symphysis pubis osteomyelitis with abscess.  Status post CT-guided drainage 11/26/2024.  Cultures growing E. coli and Proteus.  These are incongruent with blood and urine cultures  Complicated UTI with Enterobacter and

## 2024-12-03 ENCOUNTER — APPOINTMENT (OUTPATIENT)
Dept: GENERAL RADIOLOGY | Age: 89
DRG: 871 | End: 2024-12-03
Attending: INTERNAL MEDICINE
Payer: MEDICARE

## 2024-12-03 LAB
ALBUMIN SERPL-MCNC: 2.4 G/DL (ref 3.5–5.2)
ALP SERPL-CCNC: 78 U/L (ref 35–104)
ALT SERPL-CCNC: 8 U/L (ref 0–32)
ANION GAP SERPL CALCULATED.3IONS-SCNC: 5 MMOL/L (ref 7–16)
AST SERPL-CCNC: 10 U/L (ref 0–31)
BASOPHILS # BLD: 0 K/UL (ref 0–0.2)
BASOPHILS NFR BLD: 0 % (ref 0–2)
BILIRUB SERPL-MCNC: 0.2 MG/DL (ref 0–1.2)
BUN SERPL-MCNC: 37 MG/DL (ref 6–23)
CALCIUM SERPL-MCNC: 8.8 MG/DL (ref 8.6–10.2)
CHLORIDE SERPL-SCNC: 110 MMOL/L (ref 98–107)
CO2 SERPL-SCNC: 25 MMOL/L (ref 22–29)
CREAT SERPL-MCNC: 1 MG/DL (ref 0.5–1)
EOSINOPHIL # BLD: 0.04 K/UL (ref 0.05–0.5)
EOSINOPHILS RELATIVE PERCENT: 1 % (ref 0–6)
ERYTHROCYTE [DISTWIDTH] IN BLOOD BY AUTOMATED COUNT: 19.6 % (ref 11.5–15)
GFR, ESTIMATED: 52 ML/MIN/1.73M2
GLUCOSE SERPL-MCNC: 87 MG/DL (ref 74–99)
HCT VFR BLD AUTO: 35 % (ref 34–48)
HGB BLD-MCNC: 10.2 G/DL (ref 11.5–15.5)
IMM GRANULOCYTES # BLD AUTO: 0.05 K/UL (ref 0–0.58)
IMM GRANULOCYTES NFR BLD: 1 % (ref 0–5)
LYMPHOCYTES NFR BLD: 1.71 K/UL (ref 1.5–4)
LYMPHOCYTES RELATIVE PERCENT: 30 % (ref 20–42)
MAGNESIUM SERPL-MCNC: 2 MG/DL (ref 1.6–2.6)
MCH RBC QN AUTO: 25.6 PG (ref 26–35)
MCHC RBC AUTO-ENTMCNC: 29.1 G/DL (ref 32–34.5)
MCV RBC AUTO: 87.7 FL (ref 80–99.9)
MONOCYTES NFR BLD: 0.33 K/UL (ref 0.1–0.95)
MONOCYTES NFR BLD: 6 % (ref 2–12)
NEUTROPHILS NFR BLD: 63 % (ref 43–80)
NEUTS SEG NFR BLD: 3.66 K/UL (ref 1.8–7.3)
PHOSPHATE SERPL-MCNC: 2.1 MG/DL (ref 2.5–4.5)
PLATELET # BLD AUTO: 259 K/UL (ref 130–450)
PMV BLD AUTO: 10 FL (ref 7–12)
POTASSIUM SERPL-SCNC: 3.2 MMOL/L (ref 3.5–5)
PROT SERPL-MCNC: 4.9 G/DL (ref 6.4–8.3)
RBC # BLD AUTO: 3.99 M/UL (ref 3.5–5.5)
SODIUM SERPL-SCNC: 140 MMOL/L (ref 132–146)
WBC OTHER # BLD: 5.8 K/UL (ref 4.5–11.5)

## 2024-12-03 PROCEDURE — 36415 COLL VENOUS BLD VENIPUNCTURE: CPT

## 2024-12-03 PROCEDURE — 80053 COMPREHEN METABOLIC PANEL: CPT

## 2024-12-03 PROCEDURE — 2060000000 HC ICU INTERMEDIATE R&B

## 2024-12-03 PROCEDURE — 6360000002 HC RX W HCPCS: Performed by: RADIOLOGY

## 2024-12-03 PROCEDURE — 2709999900 US INSERT TUNNELED INTRAPERITONEAL CATH W OR WO CONTRAST PERC S&I

## 2024-12-03 PROCEDURE — 6370000000 HC RX 637 (ALT 250 FOR IP)

## 2024-12-03 PROCEDURE — 2700000000 HC OXYGEN THERAPY PER DAY

## 2024-12-03 PROCEDURE — 71045 X-RAY EXAM CHEST 1 VIEW: CPT

## 2024-12-03 PROCEDURE — 2580000003 HC RX 258: Performed by: SPECIALIST

## 2024-12-03 PROCEDURE — 85025 COMPLETE CBC W/AUTO DIFF WBC: CPT

## 2024-12-03 PROCEDURE — 84100 ASSAY OF PHOSPHORUS: CPT

## 2024-12-03 PROCEDURE — 83735 ASSAY OF MAGNESIUM: CPT

## 2024-12-03 PROCEDURE — 6360000002 HC RX W HCPCS: Performed by: SPECIALIST

## 2024-12-03 PROCEDURE — 76937 US GUIDE VASCULAR ACCESS: CPT

## 2024-12-03 PROCEDURE — 0JH63XZ INSERTION OF TUNNELED VASCULAR ACCESS DEVICE INTO CHEST SUBCUTANEOUS TISSUE AND FASCIA, PERCUTANEOUS APPROACH: ICD-10-PCS | Performed by: INTERNAL MEDICINE

## 2024-12-03 PROCEDURE — 02HV33Z INSERTION OF INFUSION DEVICE INTO SUPERIOR VENA CAVA, PERCUTANEOUS APPROACH: ICD-10-PCS | Performed by: INTERNAL MEDICINE

## 2024-12-03 PROCEDURE — 6360000002 HC RX W HCPCS: Performed by: NURSE PRACTITIONER

## 2024-12-03 PROCEDURE — 2709999900 FL TUNNELED CVC PLACE WO SQ PORT/PUMP > 5 YEARS

## 2024-12-03 PROCEDURE — 2580000003 HC RX 258

## 2024-12-03 PROCEDURE — 6360000002 HC RX W HCPCS

## 2024-12-03 RX ORDER — HYDROCORTISONE SODIUM SUCCINATE 100 MG/2ML
25 INJECTION INTRAMUSCULAR; INTRAVENOUS 2 TIMES DAILY
Status: COMPLETED | OUTPATIENT
Start: 2024-12-03 | End: 2024-12-05

## 2024-12-03 RX ORDER — LIDOCAINE HYDROCHLORIDE 20 MG/ML
INJECTION, SOLUTION INFILTRATION; PERINEURAL PRN
Status: COMPLETED | OUTPATIENT
Start: 2024-12-03 | End: 2024-12-03

## 2024-12-03 RX ADMIN — PANTOPRAZOLE SODIUM 40 MG: 40 TABLET, DELAYED RELEASE ORAL at 06:04

## 2024-12-03 RX ADMIN — MEROPENEM 1000 MG: 1 INJECTION INTRAVENOUS at 22:01

## 2024-12-03 RX ADMIN — HYDROCORTISONE SODIUM SUCCINATE 25 MG: 100 INJECTION, POWDER, FOR SOLUTION INTRAMUSCULAR; INTRAVENOUS at 21:59

## 2024-12-03 RX ADMIN — SODIUM CHLORIDE, PRESERVATIVE FREE 10 ML: 5 INJECTION INTRAVENOUS at 21:59

## 2024-12-03 RX ADMIN — LIDOCAINE HYDROCHLORIDE 15 ML: 20 INJECTION, SOLUTION INFILTRATION; PERINEURAL at 11:57

## 2024-12-03 RX ADMIN — LIDOCAINE HYDROCHLORIDE 15 ML: 20 INJECTION, SOLUTION INFILTRATION; PERINEURAL at 13:11

## 2024-12-03 RX ADMIN — MEROPENEM 1000 MG: 1 INJECTION INTRAVENOUS at 06:04

## 2024-12-03 RX ADMIN — MEROPENEM 1000 MG: 1 INJECTION INTRAVENOUS at 15:21

## 2024-12-03 RX ADMIN — HYDROCORTISONE SODIUM SUCCINATE 50 MG: 100 INJECTION, POWDER, FOR SOLUTION INTRAMUSCULAR; INTRAVENOUS at 08:42

## 2024-12-03 RX ADMIN — SODIUM CHLORIDE, PRESERVATIVE FREE 10 ML: 5 INJECTION INTRAVENOUS at 08:43

## 2024-12-03 ASSESSMENT — PAIN SCALES - GENERAL
PAINLEVEL_OUTOF10: 0

## 2024-12-03 ASSESSMENT — PAIN SCALES - PAIN ASSESSMENT IN ADVANCED DEMENTIA (PAINAD)
TOTALSCORE: 0
BREATHING: NORMAL
CONSOLABILITY: NO NEED TO CONSOLE
FACIALEXPRESSION: SMILING OR INEXPRESSIVE
BODYLANGUAGE: RELAXED

## 2024-12-03 NOTE — OR NURSING
Patient to the IR dept (fluoro) for image guided insertion of a tunneled left chest pleurX catheter with possible conscious sedation. Telephone consent given per patients daughter. Patient positioned on the fluoro table in a side lying position with O2 and monitoring equipment attached. Patient scanned and images reviewed by Dr Judge. Patient prepped and draped per protocol. Lidocaine administered to the procedural site. 15.5 English pleurX catheter placed by Dr Judge. Catheter sutured in place. Patient re-scanned. (500) ml of (clear yellow) colored pleural fluid drained. Puncture site cleansed, DSD applied to the site. Patient tolerated well. Procedure completed at 1331. Nurse to nurse report called. Patient transported out of the dept and back the floor w/o complication.

## 2024-12-03 NOTE — CARE COORDINATION
Spoke with Savana at [facility].  She continues to follow patient.  She confirms neither the tunneled PICC, the pluerex cath nor the q8 hr IV merrem for 6 weeks will impede patient's return to facility.  Patient will need followed and assisted with discharge planning as necessary.   Can Grant, MSN RN  [facility] Case Management  [phone]

## 2024-12-03 NOTE — OR NURSING
Patient brought down to the IR dept for image guided insertion of a tunneled central venous catheter w/ possible conscious sedation. Dr Judge in to speak to the patient prior to the procedure, all questions/concerns addressed. Telephone consent given per the patients daughter \"Lore Adan\". IV flushed and checked for patency. Patient connected to monitoring and oxygen equipment. Patient positioned on fluoro table supine. Patient prepped and draped per protocol. Lidocaine administered to the procedural site. Using ultrasound and fluoro, patient scanned and images reviewed by Dr Judge. 5 Hungarian dual lumen central venous catheter placed to the right internal jugular vein. Catheter sutured in place. Both ports with blood return, flushed with saline. Patient tolerated well. Procedure completed @ 1215.

## 2024-12-03 NOTE — PROGRESS NOTES
Attempt to see patient. Off floor for pleur x chest tube. To drain daily and record output. Follow repeat cxr in am.   Electronically signed by MACK Frias CNP on 12/3/2024 at 1:05 PM

## 2024-12-03 NOTE — PLAN OF CARE
Problem: Chronic Conditions and Co-morbidities  Goal: Patient's chronic conditions and co-morbidity symptoms are monitored and maintained or improved  Outcome: Progressing     Problem: Discharge Planning  Goal: Discharge to home or other facility with appropriate resources  Outcome: Progressing     Problem: Skin/Tissue Integrity  Goal: Absence of new skin breakdown  Description: 1.  Monitor for areas of redness and/or skin breakdown  2.  Assess vascular access sites hourly  3.  Every 4-6 hours minimum:  Change oxygen saturation probe site  4.  Every 4-6 hours:  If on nasal continuous positive airway pressure, respiratory therapy assess nares and determine need for appliance change or resting period.  12/2/2024 2131 by Korin Ibrahim, RN  Outcome: Progressing  12/2/2024 1009 by Savana Roth, RN  Outcome: Progressing

## 2024-12-03 NOTE — PROGRESS NOTES
Infectious Disease  Progress Note  [facility]    Chief Complaint: sepsis    Subjective:   No new problems reported by nursing.    Scheduled Meds:   hydrocortisone sodium succinate PF  25 mg IntraVENous BID    enoxaparin  40 mg SubCUTAneous Daily    meropenem (MERREM) 1,000 mg in sodium chloride (PF) 0.9 % 20 mL IV syringe  1,000 mg IntraVENous Q8H    lidocaine 1 % injection  50 mg IntraDERmal Once    sodium chloride flush  5-40 mL IntraVENous 2 times per day    pantoprazole  40 mg Oral QAM AC     Continuous Infusions:   sodium chloride      sodium chloride      dextrose       PRN Meds:sodium chloride flush, sodium chloride, sodium chloride flush, sodium chloride, potassium chloride **OR** potassium alternative oral replacement **OR** [DISCONTINUED] potassium chloride, magnesium sulfate, ondansetron **OR** ondansetron, polyethylene glycol, acetaminophen **OR** acetaminophen, glucose, dextrose bolus **OR** dextrose bolus, glucagon (rDNA), dextrose    Prior to Admission medications    Medication Sig Start Date End Date Taking? Authorizing Provider   amLODIPine (NORVASC) 2.5 MG tablet Take 1 tablet by mouth daily Hold if SBP < 120   Yes Delfino Gonzalez MD   sodium chloride 0.9 % SOLN 100 mL with ferric gluconate 12.5 MG/ML SOLN 125 mg Infuse 125 mg intravenously every 7 days THURSDAYS 11/21/24 12/26/24 Yes Delfino Gonzalez MD   gabapentin (NEURONTIN) 100 MG capsule Take 1 capsule by mouth 2 times daily.   Yes Delfino Gonzalez MD   ondansetron (ZOFRAN-ODT) 4 MG disintegrating tablet Take 1 tablet by mouth every 8 hours as needed for Nausea or Vomiting   Yes Delfino Gonzalez MD   sodium bicarbonate 650 MG tablet Take 1 tablet by mouth 3 times daily   Yes Delfino Gonzalez MD   donepezil (ARICEPT) 10 MG tablet Take 1 tablet by mouth nightly    Delfino Gonzalez MD   memantine (NAMENDA) 5 MG tablet Take 1 tablet by mouth 2 times daily    Delfino Gonzalez MD   Multiple Vitamin (MULTIVITAMIN

## 2024-12-03 NOTE — PROGRESS NOTES
[place] [facility]                                Progress note    Subjective:  No acute events or issues overnight  Resting comfortably in bed  Has no acute complaints  Confused      Objective:    BP (!) 155/75   Pulse 60   Temp 97.9 °F (36.6 °C) (Axillary)   Resp 15   Ht 1.524 m (5')   Wt 56 kg (123 lb 6.4 oz)   SpO2 100%   BMI 24.10 kg/m²     In: -   Out: 1000   In: -   Out: 1000 [Urine:500]      General appearance: NAD, conversant, chronically ill-appearing  HEENT: AT/NC, MMM  Neck: FROM, supple  Lungs: Diminished, L chest tube   CV: RRR, no MRGs-L pluerx cath  Vasc: Radial pulses 2+  Abdomen: Soft, non-tender; no masses or HSM-ostomy in place with output liquid  Extremities: No peripheral edema or digital cyanosis, R PICC  Skin: no rash, lesions or ulcers  Psych: Alert and oriented to person, place , intermittent confusion  Neuro: Alert and interactive     Recent Labs     12/01/24  1700 12/02/24  0555 12/03/24  0800   WBC 8.1 6.0 5.8   HGB 10.9* 9.4* 10.2*   HCT 36.8 31.5* 35.0    230 259       Recent Labs     12/01/24  1700 12/02/24  0555 12/03/24  0800    142 140   K 3.5 3.8 3.2*   * 113* 110*   CO2 24 21* 25   BUN 34* 33* 37*   CREATININE 1.0 0.9 1.0   CALCIUM 8.9 8.7 8.8       Assessment:    Principal Problem:    Sepsis (HCC)  Active Problems:    Pleural effusion    Acute cystitis without hematuria    Severe protein-calorie malnutrition (HCC)  Resolved Problems:    * No resolved hospital problems. *      Plan:    Patient is an 89-year-old female admitted to [facility] for  Sepsis  -Monitor labs  -Resolution of leukocytosis, 6.7  -Remains on IV Zosyn  -MRI of the pelvis with osteomyelitis of the bilateral pubic rami  -IR consulted for drainage of fluid collection  -Follow cultures  -No further needs from surgery  -s/p left thoracentesis on 11/23 which appears to be transudative  -Continue IV Solu-Cortef 100 mg q8h   -Replace electrolytes as needed  -Weaned off of  supplemental O2 satting well on room air  -Vital signs stable  -Await discharge plans from case management     11/26/24  -CXR: Moderate to large left pneumothorax  -Chest tube placed by pulmonology today  -IR procedure for drainage of abscess  -Follow cultures  -IV Zosyn switched to IV Merrem per infectious disease  -Begin weaning of Solu-Cortef 50 mg every 8 hours  -PICC line ordered by ID for ongoing antibiotics on discharge  -K+ 3.4, replace  -Kidney function remains about the same at 30/1.4  -Improvement in H&H, 8.7/30.6  -Continue to monitor labs and vitals     11/27/24  -Improving H&H, 9.9/24.3  -Follow cultures  -Await PICC line placement for ongoing antibiotics  -Continue IV Merrem  -Continue to wean Solu-Cortef  -Remains to be persistently bradycardic in the 40s to 50s, may need cardiology evaluation  -Saturating well on room air 96%  -Continue to monitor labs and vitals     11/28/24  -Kidney function remains about the same  -Remains on IV Merrem per infectious disease  -PICC line pending placement by IV team for ongoing antibiotics on discharge  -Continue to wean IV Solu-Cortef  -Improvement in bradycardia could be secondary to medications given for IR procedure, continue to monitor vitals  -Chest tube management per pulmonology  -Patient will return back to her nursing facility on discharge     11/29/24  -Kidney function remains about the same  -Remains on IV Merrem per infectious disease for 6 weeks per ID   -PICC line pending placement by IV team for ongoing antibiotics on discharge  -Continue to wean IV Solu-Cortef  -Chest tube management per pulmonology  -Patient will return back to her nursing facility on discharge     11/30/2024  Resting comfortably in no acute distress  No family members at bedside  Left-sided small bore chest tube remains in place-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

## 2024-12-04 ENCOUNTER — APPOINTMENT (OUTPATIENT)
Dept: GENERAL RADIOLOGY | Age: 89
DRG: 871 | End: 2024-12-04
Attending: INTERNAL MEDICINE
Payer: MEDICARE

## 2024-12-04 LAB
ALBUMIN SERPL-MCNC: 2.2 G/DL (ref 3.5–5.2)
ALP SERPL-CCNC: 75 U/L (ref 35–104)
ALT SERPL-CCNC: 6 U/L (ref 0–32)
ANION GAP SERPL CALCULATED.3IONS-SCNC: 7 MMOL/L (ref 7–16)
AST SERPL-CCNC: 10 U/L (ref 0–31)
BASOPHILS # BLD: 0 K/UL (ref 0–0.2)
BASOPHILS NFR BLD: 0 % (ref 0–2)
BILIRUB SERPL-MCNC: 0.2 MG/DL (ref 0–1.2)
BUN SERPL-MCNC: 44 MG/DL (ref 6–23)
CALCIUM SERPL-MCNC: 9 MG/DL (ref 8.6–10.2)
CHLORIDE SERPL-SCNC: 111 MMOL/L (ref 98–107)
CO2 SERPL-SCNC: 24 MMOL/L (ref 22–29)
CREAT SERPL-MCNC: 1 MG/DL (ref 0.5–1)
EOSINOPHIL # BLD: 0 K/UL (ref 0.05–0.5)
EOSINOPHILS RELATIVE PERCENT: 0 % (ref 0–6)
ERYTHROCYTE [DISTWIDTH] IN BLOOD BY AUTOMATED COUNT: 20.1 % (ref 11.5–15)
GFR, ESTIMATED: 53 ML/MIN/1.73M2
GLUCOSE SERPL-MCNC: 113 MG/DL (ref 74–99)
HCT VFR BLD AUTO: 34.3 % (ref 34–48)
HGB BLD-MCNC: 10.3 G/DL (ref 11.5–15.5)
LYMPHOCYTES NFR BLD: 0.34 K/UL (ref 1.5–4)
LYMPHOCYTES RELATIVE PERCENT: 5 % (ref 20–42)
MAGNESIUM SERPL-MCNC: 1.9 MG/DL (ref 1.6–2.6)
MCH RBC QN AUTO: 26.3 PG (ref 26–35)
MCHC RBC AUTO-ENTMCNC: 30 G/DL (ref 32–34.5)
MCV RBC AUTO: 87.5 FL (ref 80–99.9)
MONOCYTES NFR BLD: 0.22 K/UL (ref 0.1–0.95)
MONOCYTES NFR BLD: 3 % (ref 2–12)
NEUTROPHILS NFR BLD: 91 % (ref 43–80)
NEUTS SEG NFR BLD: 5.94 K/UL (ref 1.8–7.3)
PHOSPHATE SERPL-MCNC: 2.4 MG/DL (ref 2.5–4.5)
PLATELET # BLD AUTO: 242 K/UL (ref 130–450)
PMV BLD AUTO: 9.6 FL (ref 7–12)
POTASSIUM SERPL-SCNC: 3.8 MMOL/L (ref 3.5–5)
PROT SERPL-MCNC: 4.7 G/DL (ref 6.4–8.3)
RBC # BLD AUTO: 3.92 M/UL (ref 3.5–5.5)
RBC # BLD: ABNORMAL 10*6/UL
SODIUM SERPL-SCNC: 142 MMOL/L (ref 132–146)
WBC OTHER # BLD: 6.5 K/UL (ref 4.5–11.5)

## 2024-12-04 PROCEDURE — 6360000002 HC RX W HCPCS: Performed by: NURSE PRACTITIONER

## 2024-12-04 PROCEDURE — 2580000003 HC RX 258: Performed by: SPECIALIST

## 2024-12-04 PROCEDURE — 6370000000 HC RX 637 (ALT 250 FOR IP)

## 2024-12-04 PROCEDURE — 84100 ASSAY OF PHOSPHORUS: CPT

## 2024-12-04 PROCEDURE — 71045 X-RAY EXAM CHEST 1 VIEW: CPT

## 2024-12-04 PROCEDURE — 2060000000 HC ICU INTERMEDIATE R&B

## 2024-12-04 PROCEDURE — 85025 COMPLETE CBC W/AUTO DIFF WBC: CPT

## 2024-12-04 PROCEDURE — 2580000003 HC RX 258

## 2024-12-04 PROCEDURE — 83735 ASSAY OF MAGNESIUM: CPT

## 2024-12-04 PROCEDURE — 6360000002 HC RX W HCPCS

## 2024-12-04 PROCEDURE — 80053 COMPREHEN METABOLIC PANEL: CPT

## 2024-12-04 PROCEDURE — 6360000002 HC RX W HCPCS: Performed by: SPECIALIST

## 2024-12-04 RX ADMIN — HYDROCORTISONE SODIUM SUCCINATE 25 MG: 100 INJECTION, POWDER, FOR SOLUTION INTRAMUSCULAR; INTRAVENOUS at 21:50

## 2024-12-04 RX ADMIN — HYDROCORTISONE SODIUM SUCCINATE 25 MG: 100 INJECTION, POWDER, FOR SOLUTION INTRAMUSCULAR; INTRAVENOUS at 08:26

## 2024-12-04 RX ADMIN — MEROPENEM 1000 MG: 1 INJECTION INTRAVENOUS at 06:30

## 2024-12-04 RX ADMIN — SODIUM CHLORIDE, PRESERVATIVE FREE 10 ML: 5 INJECTION INTRAVENOUS at 21:50

## 2024-12-04 RX ADMIN — PANTOPRAZOLE SODIUM 40 MG: 40 TABLET, DELAYED RELEASE ORAL at 06:30

## 2024-12-04 RX ADMIN — MEROPENEM 1000 MG: 1 INJECTION INTRAVENOUS at 13:15

## 2024-12-04 RX ADMIN — SODIUM CHLORIDE, PRESERVATIVE FREE 10 ML: 5 INJECTION INTRAVENOUS at 08:26

## 2024-12-04 RX ADMIN — MEROPENEM 1000 MG: 1 INJECTION INTRAVENOUS at 21:51

## 2024-12-04 RX ADMIN — ENOXAPARIN SODIUM 40 MG: 100 INJECTION SUBCUTANEOUS at 08:26

## 2024-12-04 ASSESSMENT — PAIN SCALES - GENERAL
PAINLEVEL_OUTOF10: 0

## 2024-12-04 NOTE — PROGRESS NOTES
Physician Progress Note      PATIENT:               NAMAN STOKES  [facility] #:                  [id number]  :                       1935  ADMIT DATE:       2024 10:25 PM  DISCH DATE:  RESPONDING  PROVIDER #:        Caity Ann MD          QUERY TEXT:    Patient admitted with Sepsis.  Noted documentation of sepsis probable source   UTI per H&P and Recurrent sepsis likely translocation from pubic symphysis vs   UTI per infectious Disease on 24. If possible, please document in   progress notes and discharge summary the source of sepsis:    The medical record reflects the following:  Risk Factors: chronic symphysis pubis osteomyelitis and abscess, Complicated   UTI; Enterobacter Cloacae bacteremia.  Clinical Indicators:  Per H&P: Sepsis probable source UTI  24: Infectious Disease: Recurrent sepsis episodes: likely translocation   from pubic symphysis. Chronic pubic symphysis osteomyelitis  24: Infectious Disease: Recurrent sepsis episodes: likely translocation   from pubic symphysis VS UTI  24: Per infectious Disease: Enterobacter cloacae bacteremia. Symphysis   pubis osteomyelitis with abscess. Status post CT guided drainage 24.    Cultures growing E. coli and proteus these are incongruent with blood and   urine culture.  Complicated UTI with Enterobacter and Citrobacter.  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 Continue Meropenem for a minimum of 6 weeks.  Check final   cxs--incentive to Merrem. Awaiting PICC  24: Blood Culture: Enterobacter Cloacae complex--detected  24: Urine culture: Citrobacter Koseri >100,000;  Enterobacter cloaacae   >100,000  24: Drainage of Pubic Symphysis culture: gram negative rods moderate   growth beta Lactamase positive; Gram negative rods light growth beta lactamase   positive.  Gram positive rods resembling diphtheroid isolated from anaerobic

## 2024-12-04 NOTE — PROGRESS NOTES
0404  Gross per 24 hour   Intake --   Output 650 ml   Net -650 ml                      CURRENT MEDS :  Scheduled Meds:   hydrocortisone sodium succinate PF  25 mg IntraVENous BID    enoxaparin  40 mg SubCUTAneous Daily    meropenem (MERREM) 1,000 mg in sodium chloride (PF) 0.9 % 20 mL IV syringe  1,000 mg IntraVENous Q8H    lidocaine 1 % injection  50 mg IntraDERmal Once    sodium chloride flush  5-40 mL IntraVENous 2 times per day    pantoprazole  40 mg Oral QAM AC       Physical Exam:  General Appearance: appears comfortable in no acute distress.   HEENT: Normocephalic atraumatic without obvious abnormality   Neck: Lips, mucosa, and tongue normal.  Supple, symmetrical, trachea midline, no adenopathy;thyroid:  no enlargement/tenderness/nodules or JVD.  Lung: Breath sounds CTA. Respirations   unlabored. Symmetrical expansion.  Chest tube clamped-removed today  Left Pleurx with dry dressing in place  Heart: RRR, normal S1, S2. No MRG  Abdomen: Soft, NT, ND. BS present x 4 quadrants. No bruit or organomegaly.   Extremities: Pedal pulses 2+ symmetric b/l.  Extremities normal, no cyanosis, clubbing, or edema.   Musculokeletal: No joint swelling, no muscle tenderness. ROM normal in all joints of extremities.   Neurologic: Mental status: Alert and Oriented X3 .    Pertinent/ New Labs and Imaging Studies     Imaging personally reviewed:  Chest x-ray 12/4/2024         Chest x-ray 12-2-24  FINDINGS:  Sing of the chest reveals chest tube identified on the left.  Mild increased  markings identified at the left lung base with small left pleural effusion.  No definite pneumothorax.  The right lung is clear.  Degenerative changes  seen within the spine.  Heart is enlarged.     IMPRESSION:  Left-sided chest tube with no definite pneumothorax. Mild increased markings  at the left lung base with small left pleural effusion.     Echo: 11/25/2024    Left Ventricle: The left ventricle is normal in size with normal thickness of  \"TROPONINI\" in the last 72 hours.  No results for input(s): \"PROCAL\" in the last 72 hours.  This SmartLink has not been configured with any valid records.     Pleural fluid cytology 11/23/2024    Path Number: [id number]    INTERPRETATION    PLEURAL FLUID, LEFT:  Satisfactory for evaluation.  POSITIVE FOR MALIGNANCY.  Adenocarcinoma  Cellblock is paucicellular.     Pleural fluid analysis 11/23/2024   Latest Reference Range & Units 11/23/24 16:30   Appearance, Fluid  Clear   Color, Fluid  Yellow   RBC, Fluid cells/uL 2,000   Glucose, Fluid mg/dL 156   LD, Fluid U/L 87   Monocyte Count, Fluid % 70   Neutrophil Count, Fluid % 30   Total Protein, Body Fluid g/dL 3.3   WBC, Fluid cells/uL 164   Clot Check  None Seen         Micro:  No results for input(s): \"CULTRESP\" in the last 72 hours.  No results for input(s): \"LABGRAM\" in the last 72 hours.  No results for input(s): \"LEGUR\" in the last 72 hours.  No results for input(s): \"STREPNEUMAGU\" in the last 72 hours.  No results for input(s): \"LP1UAG\" in the last 72 hours.  Specimen Description .DRAIN pubic symphysis [facility]   Special Requests Site: Body Fluid [facility]   Direct Exam Gram stain performed on unspun fluid. [facility]    MODERATE EPITHELIAL CELLS [facility]    MODERATE Polymorphonuclear leukocytes [facility]    MANY GRAM NEGATIVE RODS Abnormal  [facility]    MODERATE GRAM POSITIVE COCCI IN PAIRS Abnormal  [facility]    FEW GRAM POSITIVE COCCI IN CHAINS Abnormal  [facility]    FEW GRAM VARIABLE RODS Abnormal  [facility]   Culture ESCHERICHIA COLI HEAVY GROWTH Abnormal  [facility]    PROTEUS MIRABILIS MODERATE GROWTH Abnormal  [facility]    Contains abnormal data Culture, Blood 1  Order: [id number]  Status: Final  result       Visible to patient: No (not released)       Next appt: None    Specimen Information: Blood   0 Result Notes       Component  Resulting Agency   Specimen Description .BLOOD [facility]   Special Requests Site: Blood [facility]   Biofire test comment AEROBIC BLD BOTTLE [facility]   Direct Exam DIRECT GRAM STAIN FROM BOTTLE: GRAM NEGATIVE RODS [facility]    Comment: Direct Gram Stain from bottle result called to and read back by: lelo thomas,rn 11/24/24  at 1218   Culture ENTEROBACTER CLOACAE COMPLEX Abnormal           Assessment:    Acute respiratory failure with hypoxia-resolved  Recurrent left pleural effusion, s/p thoracentesis 11/23/24 with 2.1 L removed, transudate, culture negative.  Positive malignant effusion adenocarcinoma  Left pneumothorax - s/p chest tube placement 11/26/2024 14 [place] by Dr Galarza, removal 12/4/2024  Left Pleurx chest tube placement by IR 12/3/2024  Sepsis secondary to UTI/hydronephrosis   Enterobacter bacteremia  Uterine cancer  UTI  Chronic pubic symphysis osteomyelitis, s/p colocutaneous fistula . Follows at [facility]  Advanced dementia      Plan:   Monitor off oxygen keep SpO2 greater than 92%  Follow chest x-ray 12/4/2024   Remove left side apical anterior chest tube today.  Daily drainage of left Pleurx chest tube.  Meropenem per ID recommendations.  Await final cultures from symphysis pubis positive for E. coli, Proteus  Solu-Cortef taper 50 mg twice daily-taper written  DVT, GI prophylaxis  PT/OT  Daughter states she is going to incorporate comfort measures with hospice services upon return to nursing facility.   This plan of care was reviewed in collaboration with Dr. Hernandez    Electronically signed by MACK Frias - CNP on 12/4/2024 at 9:51 AM      This is confirmation that I have personally performed a substantial portion of medical decision making (>50%) related to this

## 2024-12-04 NOTE — PROGRESS NOTES
[facility]                                Progress note    Subjective:    Patient is resting comfortably in bed   Denies pain, or any discomfort    Objective:    BP (!) 120/92   Pulse 75   Temp 97.3 °F (36.3 °C) (Temporal)   Resp 18   Ht 1.524 m (5')   Wt 56 kg (123 lb 6.4 oz)   SpO2 (!) 10%   BMI 24.10 kg/m²     In: -   Out: 650   In: -   Out: 650 [Urine:150]    General appearance: NAD, conversant, chronically ill-appearing  HEENT: AT/NC, MMM  Neck: FROM, supple  Lungs: Diminished, L chest tube   CV: RRR, no MRGs-L pluerx cath  Vasc: Radial pulses 2+  Abdomen: Soft, non-tender; no masses or HSM-ostomy in place with output liquid  Extremities: No peripheral edema or digital cyanosis, R PICC  Skin: no rash, lesions or ulcers  Psych: Alert and oriented to person, place , intermittent confusion  Neuro: Alert and interactive     Recent Labs     12/02/24  0555 12/03/24  0800 12/04/24  0410   WBC 6.0 5.8 6.5   HGB 9.4* 10.2* 10.3*   HCT 31.5* 35.0 34.3    259 242       Recent Labs     12/02/24  0555 12/03/24  0800 12/04/24  0410    140 142   K 3.8 3.2* 3.8   * 110* 111*   CO2 21* 25 24   BUN 33* 37* 44*   CREATININE 0.9 1.0 1.0   CALCIUM 8.7 8.8 9.0       Assessment:    Principal Problem:    Sepsis (HCC)  Active Problems:    Pleural effusion    Acute cystitis without hematuria    Severe protein-calorie malnutrition (HCC)  Resolved Problems:    * No resolved hospital problems. *      Plan:    Patient is an 89-year-old female admitted to [facility] for  Sepsis  -Monitor labs  -Resolution of leukocytosis, 6.7  -Remains on IV Zosyn  -MRI of the pelvis with osteomyelitis of the bilateral pubic rami  -IR consulted for drainage of fluid collection  -Follow cultures  -No further needs from surgery  -s/p left thoracentesis on 11/23 which appears to be transudative  -Continue IV Solu-Cortef 100 mg q8h   -Replace electrolytes as needed  -Weaned off of supplemental O2 satting well on

## 2024-12-04 NOTE — PROGRESS NOTES
Spiritual Health History and Assessment/Progress Note  [facility]    Spiritual/Emotional Needs,  ,  ,      Name: Estefany Levine MRN: [id number]    Age: 89 y.o.     Sex: female   Language: English   Mormon: None   Sepsis (HCC)     Date: 12/3/2024                           Spiritual Assessment began in [address] INTERNAL MEDICINE 2        Referral/Consult From: Rounding   Encounter Overview/Reason: Spiritual/Emotional Needs  Service Provided For: Patient    Giovanna, Belief, Meaning:   Patient has beliefs or practices that help with coping during difficult times  Family/Friends No family/friends present      Importance and Influence:  Patient has no beliefs influential to healthcare decision-making identified during this visit  Family/Friends No family/friends present    Community:  Patient feels well-supported. Support system includes: Extended family  Family/Friends No family/friends present    Assessment and Plan of Care:     Patient Interventions include: Provided sacramental/Jew ritual  Family/Friends Interventions include: No family/friends present    Patient Plan of Care: Spiritual Care available upon further referral  Family/Friends Plan of Care: No family/friends present    Electronically signed by Chaplain Juanita on 12/3/2024 at 7:30 PM

## 2024-12-05 VITALS
WEIGHT: 123.4 LBS | HEART RATE: 64 BPM | SYSTOLIC BLOOD PRESSURE: 134 MMHG | HEIGHT: 60 IN | DIASTOLIC BLOOD PRESSURE: 69 MMHG | TEMPERATURE: 97.1 F | RESPIRATION RATE: 18 BRPM | OXYGEN SATURATION: 100 % | BODY MASS INDEX: 24.23 KG/M2

## 2024-12-05 LAB
ALBUMIN SERPL-MCNC: 2.1 G/DL (ref 3.5–5.2)
ALP SERPL-CCNC: 73 U/L (ref 35–104)
ALT SERPL-CCNC: 8 U/L (ref 0–32)
ANION GAP SERPL CALCULATED.3IONS-SCNC: 7 MMOL/L (ref 7–16)
AST SERPL-CCNC: 8 U/L (ref 0–31)
BASOPHILS # BLD: 0 K/UL (ref 0–0.2)
BASOPHILS NFR BLD: 0 % (ref 0–2)
BILIRUB SERPL-MCNC: 0.2 MG/DL (ref 0–1.2)
BUN SERPL-MCNC: 57 MG/DL (ref 6–23)
CALCIUM SERPL-MCNC: 8.6 MG/DL (ref 8.6–10.2)
CHLORIDE SERPL-SCNC: 110 MMOL/L (ref 98–107)
CO2 SERPL-SCNC: 24 MMOL/L (ref 22–29)
CREAT SERPL-MCNC: 0.9 MG/DL (ref 0.5–1)
EOSINOPHIL # BLD: 0.09 K/UL (ref 0.05–0.5)
EOSINOPHILS RELATIVE PERCENT: 1 % (ref 0–6)
ERYTHROCYTE [DISTWIDTH] IN BLOOD BY AUTOMATED COUNT: 19.7 % (ref 11.5–15)
GFR, ESTIMATED: 62 ML/MIN/1.73M2
GLUCOSE SERPL-MCNC: 99 MG/DL (ref 74–99)
HCT VFR BLD AUTO: 31.1 % (ref 34–48)
HGB BLD-MCNC: 9.3 G/DL (ref 11.5–15.5)
IMM GRANULOCYTES # BLD AUTO: 0.06 K/UL (ref 0–0.58)
IMM GRANULOCYTES NFR BLD: 1 % (ref 0–5)
LYMPHOCYTES NFR BLD: 2.05 K/UL (ref 1.5–4)
LYMPHOCYTES RELATIVE PERCENT: 32 % (ref 20–42)
MAGNESIUM SERPL-MCNC: 1.9 MG/DL (ref 1.6–2.6)
MCH RBC QN AUTO: 26.1 PG (ref 26–35)
MCHC RBC AUTO-ENTMCNC: 29.9 G/DL (ref 32–34.5)
MCV RBC AUTO: 87.4 FL (ref 80–99.9)
MONOCYTES NFR BLD: 0.29 K/UL (ref 0.1–0.95)
MONOCYTES NFR BLD: 5 % (ref 2–12)
NEUTROPHILS NFR BLD: 61 % (ref 43–80)
NEUTS SEG NFR BLD: 3.9 K/UL (ref 1.8–7.3)
PHOSPHATE SERPL-MCNC: 1.9 MG/DL (ref 2.5–4.5)
PLATELET # BLD AUTO: 214 K/UL (ref 130–450)
PMV BLD AUTO: 10.3 FL (ref 7–12)
POTASSIUM SERPL-SCNC: 3.7 MMOL/L (ref 3.5–5)
PROT SERPL-MCNC: 4.2 G/DL (ref 6.4–8.3)
RBC # BLD AUTO: 3.56 M/UL (ref 3.5–5.5)
SODIUM SERPL-SCNC: 141 MMOL/L (ref 132–146)
WBC OTHER # BLD: 6.4 K/UL (ref 4.5–11.5)

## 2024-12-05 PROCEDURE — 83735 ASSAY OF MAGNESIUM: CPT

## 2024-12-05 PROCEDURE — 6360000002 HC RX W HCPCS: Performed by: NURSE PRACTITIONER

## 2024-12-05 PROCEDURE — 2580000003 HC RX 258: Performed by: SPECIALIST

## 2024-12-05 PROCEDURE — 6360000002 HC RX W HCPCS

## 2024-12-05 PROCEDURE — 2580000003 HC RX 258

## 2024-12-05 PROCEDURE — 6360000002 HC RX W HCPCS: Performed by: SPECIALIST

## 2024-12-05 PROCEDURE — 80053 COMPREHEN METABOLIC PANEL: CPT

## 2024-12-05 PROCEDURE — 84100 ASSAY OF PHOSPHORUS: CPT

## 2024-12-05 PROCEDURE — 85025 COMPLETE CBC W/AUTO DIFF WBC: CPT

## 2024-12-05 RX ORDER — PANTOPRAZOLE SODIUM 40 MG/1
40 TABLET, DELAYED RELEASE ORAL
DISCHARGE
Start: 2024-12-06

## 2024-12-05 RX ADMIN — MEROPENEM 1000 MG: 1 INJECTION INTRAVENOUS at 13:07

## 2024-12-05 RX ADMIN — MEROPENEM 1000 MG: 1 INJECTION INTRAVENOUS at 05:41

## 2024-12-05 RX ADMIN — SODIUM CHLORIDE, PRESERVATIVE FREE 10 ML: 5 INJECTION INTRAVENOUS at 08:26

## 2024-12-05 RX ADMIN — HYDROCORTISONE SODIUM SUCCINATE 25 MG: 100 INJECTION, POWDER, FOR SOLUTION INTRAMUSCULAR; INTRAVENOUS at 08:26

## 2024-12-05 RX ADMIN — ENOXAPARIN SODIUM 40 MG: 100 INJECTION SUBCUTANEOUS at 08:25

## 2024-12-05 ASSESSMENT — PAIN SCALES - GENERAL
PAINLEVEL_OUTOF10: 0
PAINLEVEL_OUTOF10: 0

## 2024-12-05 NOTE — DISCHARGE SUMMARY
[facility]   Discharge summary   Patient ID:  Estefany Levine  [id number]  89 y.o.  1935    Admit date: 11/22/2024    Discharge date and time: 12/5/2024    Admission Diagnoses:   Patient Active Problem List   Diagnosis    Sacral insufficiency fracture    Insufficiency fracture of pelvis    Colocutaneous fistula    Sepsis (HCC)    Pleural effusion    Acute cystitis without hematuria    Severe protein-calorie malnutrition (HCC)       Discharge Diagnoses: sepsis, osteo    Consults: pulmonary/intensive care, ID, and general surgery    Procedures: Pleurx catheter, chest tube, PICC line, L thoracentesis    Hospital Course: The patient is a 89 y.o. female of Yaw Pak DO     Patient is an 89-year-old female admitted to [facility] for  Sepsis  -Monitor labs  -Resolution of leukocytosis, 6.7  -Remains on IV Zosyn  -MRI of the pelvis with osteomyelitis of the bilateral pubic rami  -IR consulted for drainage of fluid collection  -Follow cultures  -No further needs from surgery  -s/p left thoracentesis on 11/23 which appears to be transudative  -Continue IV Solu-Cortef 100 mg q8h   -Replace electrolytes as needed  -Weaned off of supplemental O2 satting well on room air  -Vital signs stable  -Await discharge plans from case management     11/26/24  -CXR: Moderate to large left pneumothorax  -Chest tube placed by pulmonology today  -IR procedure for drainage of abscess  -Follow cultures  -IV Zosyn switched to IV Merrem per infectious disease  -Begin weaning of Solu-Cortef 50 mg every 8 hours  -PICC line ordered by ID for ongoing antibiotics on discharge  -K+ 3.4, replace  -Kidney function remains about the same at 30/1.4  -Improvement in H&H, 8.7/30.6  -Continue to monitor labs and vitals     11/27/24  -Improving H&H, 9.9/24.3  -Follow cultures  -Await PICC line placement for ongoing antibiotics  -Continue IV Merrem  -Continue to wean Solu-Cortef  -Remains to be persistently bradycardic in  2024  EXAMINATION: ONE XRAY VIEW OF THE CHEST 2024 5:58 am COMPARISON: None. HISTORY: ORDERING SYSTEM PROVIDED HISTORY: Evaluate left pleural effusion` TECHNOLOGIST PROVIDED HISTORY: Reason for exam:->Evaluate left pleural effusion` FINDINGS: Portable chest reveal complete opacification seen of the left hemithorax. Deep line catheter on the right tip in the SVC.  The right lung is clear. Degenerative changes seen within the spine.  Opacification throughout the left hemithorax may be mixture of collapsed lung and pleural fluid.  Consider CT scan of the chest for further evaluation if clinically indicated.     Complete opacification seen of the left hemithorax which may be mixture of collapsed lung and pleural fluid. Consider CT scan of the chest for further evaluation if clinically indicated     XR CHEST PORTABLE    Result Date: 2024                                      [facility]                                          [place] [address]                                              [phone]                                                XRay Report                                                  Signed    Patient: NAMAN STOKES                                                                  MR#: [id number]          : 1935                                                                Acct:[id number]            Age/Sex: 89 / F                                                                Admit Date: 24            Loc: ER                                                                                     Attending Dr:     Ordering Physician: John Whitfield   Date of Service: 24  Procedure(s): XR chest 1V portable  Accession Number(s): [id number]    cc: John Whitfield      HISTORY:  Central line placement.     TECHNIQUE:  Portable AP view.     FINDINGS:                                    CT Scan Report                                                  Signed    Patient: NAMAN STOKES                                                                  MR#: [id number]          : 1935                                                                Acct:[id number]            Age/Sex: 89 / F                                                                Admit Date: 24            Loc: ER                                                                                     Attending Dr:     Ordering Physician: Raman Sheth MD   Date of Service: 24  Procedure(s): CT abdomen pelvis wo con  Accession Number(s): [id number]    cc: Raman Sheth MD      HISTORY:  Tachycardia and hypotension.     PHYSICIAN INDICATIONS:  ab pain     Technique: Axial images through the abdomen and pelvis without intravenous contrast.  .  AEC.  k=0.015 mSv/(mGy-cm)     Findings:     Large left pleural effusion (see accompanying CT).     Postsurgical clips in the left upper quadrant.  Spleen is present.     The kidneys appear unremarkable bilaterally. No renal calculi or hydronephrosis are present.  No  ureteral stones are identified.     The liver is unremarkable. The adrenal glands are unremarkable.     The pancreas is unremarkable. Gallbladder is present. No biliary dilation.     No adenopathy or mass lesion in the mesentery or retroperitoneum. The aorta is normal in caliber.     Bowel is not distended. Postsurgical changes right colon     Montanez in bladder.     Stable open wound over the symphysis pubis with gas and debris most likely communicating with the  sigmoid colon when correlated with past study separation of the symphysis pubis with no evidence of  bony destruction.     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     Postsurgical

## 2024-12-05 NOTE — PROGRESS NOTES
Infectious Disease  Progress Note  [facility]    Chief Complaint: sepsis    Subjective:   No new problems reported by nursing.  Tolerating the IV antibiotics.    Scheduled Meds:   enoxaparin  40 mg SubCUTAneous Daily    meropenem (MERREM) 1,000 mg in sodium chloride (PF) 0.9 % 20 mL IV syringe  1,000 mg IntraVENous Q8H    lidocaine 1 % injection  50 mg IntraDERmal Once    sodium chloride flush  5-40 mL IntraVENous 2 times per day    pantoprazole  40 mg Oral QAM AC     Continuous Infusions:   sodium chloride      sodium chloride      dextrose       PRN Meds:sodium chloride flush, sodium chloride, sodium chloride flush, sodium chloride, potassium chloride **OR** potassium alternative oral replacement **OR** [DISCONTINUED] potassium chloride, magnesium sulfate, ondansetron **OR** ondansetron, polyethylene glycol, acetaminophen **OR** acetaminophen, glucose, dextrose bolus **OR** dextrose bolus, glucagon (rDNA), dextrose    Prior to Admission medications    Medication Sig Start Date End Date Taking? Authorizing Provider   pantoprazole (PROTONIX) 40 MG tablet Take 1 tablet by mouth every morning (before breakfast) 12/6/24  Yes Yasmeen Rodrigues, APRN - CNP   amLODIPine (NORVASC) 2.5 MG tablet Take 1 tablet by mouth daily Hold if SBP < 120   Yes Delfino Gonzalez MD   sodium chloride 0.9 % SOLN 100 mL with ferric gluconate 12.5 MG/ML SOLN 125 mg Infuse 125 mg intravenously every 7 days THURSDAYS 11/21/24 12/26/24 Yes Delfino Gonzalez MD   gabapentin (NEURONTIN) 100 MG capsule Take 1 capsule by mouth 2 times daily.   Yes Delfino Gonzalez MD   ondansetron (ZOFRAN-ODT) 4 MG disintegrating tablet Take 1 tablet by mouth every 8 hours as needed for Nausea or Vomiting   Yes Delfino Gonzalez MD   sodium bicarbonate 650 MG tablet Take 1 tablet by mouth 3 times daily   Yes Delfino Gonzalez MD   donepezil (ARICEPT) 10 MG tablet Take 1 tablet by mouth nightly    Delfino Gonzalez MD   memantine (NAMENDA) 5 MG

## 2024-12-05 NOTE — CARE COORDINATION
Discharge order noted  Non emergency transportation has been arranged with [facility],  time 1230-230P.  Patient's daughter, facility liaison and bedside nurse notified of scheduled  time.  Can Grant, MSN RN  [facility] Case Management  [phone]

## 2024-12-05 NOTE — PROGRESS NOTES
Mj Walden M.D.,[facility]  Star Means D.O., FSLY., [facility]  Regine Hernandez M.D.  Jenn Collier M.D.   Felix Galarza D.O.  Mike Lee M.D.         Daily Pulmonary Progress Note    Patient:  Estefany Levine 89 y.o. female MRN: [id number]            Synopsis     We are following patient for pleural effusion    \"CC\" hypoxia    Code status: Full      Subjective      Patient was seen and examined.  Eating lunch.    left Pleurx chest tube placement per IR 12/3/2024.  Daily drainage, to 50 cc output today.    Remains off oxygen, appears comfortable with breathing.  Pleasant confusion.  Underlying dementia.      Review of Systems: Difficult to obtain, cognitive impairment  Constitutional: Denies fever, weight loss, night sweats, and fatigue  Skin: Denies pigmentation, dark lesions, and rashes   HEENT: Denies hearing loss, tinnitus, ear drainage, epistaxis, sore throat, and hoarseness.  Cardiovascular: Denies palpitations, chest pain, and chest pressure.  Respiratory: Denies cough, dyspnea at rest, hemoptysis, apnea, and choking.  Gastrointestinal: Denies nausea, vomiting, poor appetite, diarrhea, heartburn or reflux  Genitourinary: Denies dysuria, frequency, urgency or hematuria  Musculoskeletal: Denies myalgias, muscle weakness, and bone pain  Neurological: Denies dizziness, vertigo, headache, and focal weakness  Psychological: Denies anxiety and depression  Endocrine: Denies heat intolerance and cold intolerance  Hematopoietic/Lymphatic: Denies bleeding problems and blood transfusions    24-hour events:  None    Objective   OBJECTIVE:   /69   Pulse 64   Temp 97.1 °F (36.2 °C) (Infrared)   Resp 18   Ht 1.524 m (5')   Wt 56 kg (123 lb 6.4 oz)   SpO2 100%   BMI 24.10 kg/m²   SpO2 Readings from Last 1 Encounters:   12/05/24 100%        I/O:    Intake/Output Summary (Last 24 hours) at 12/5/2024 1151  Last data filed at 12/5/2024 0815  Gross per 24 hour   Intake --   Output 1275 ml  motion abnormalities are identified with overall normal left ventricular systolic function.  An estimated ejection fraction of 60-65% is calculated.  Normal diastolic function is present.    Aortic Valve: Aortic morphology is suboptimally visualized with aortic leaflet thickening and no evidence of aortic stenosis.    Tricuspid Valve: The tricuspid valve is structurally normal with mild tricuspid regurgitation.    Image quality is technically difficult. Contrast used: Definity.      CT chest [facility] 11/22/2024      CT Angiogram Chest and Pulmonary Arteries with contrast and high resolution 2-D and 3-D images:         1.  No evidence of pulmonary emboli by CT technique.         2.  Very large left pleural effusion encompassing nearly the entire left hemithorax with volume    loss of the underlying left lung.  No central mass lesion.  Shift of the mediastinum suspected on    plain films is not seen on CT.         Labs:  Lab Results   Component Value Date/Time    WBC 6.4 12/05/2024 06:09 AM    RBC 3.56 12/05/2024 06:09 AM    HGB 9.3 12/05/2024 06:09 AM    HCT 31.1 12/05/2024 06:09 AM    MCV 87.4 12/05/2024 06:09 AM    MCH 26.1 12/05/2024 06:09 AM    MCHC 29.9 12/05/2024 06:09 AM    RDW 19.7 12/05/2024 06:09 AM     12/05/2024 06:09 AM    MPV 10.3 12/05/2024 06:09 AM     Lab Results   Component Value Date/Time     12/05/2024 06:09 AM    K 3.7 12/05/2024 06:09 AM     12/05/2024 06:09 AM    CO2 24 12/05/2024 06:09 AM    BUN 57 12/05/2024 06:09 AM    CREATININE 0.9 12/05/2024 06:09 AM    CALCIUM 8.6 12/05/2024 06:09 AM    GFRAA >60 08/31/2017 08:15 AM    LABGLOM 62 12/05/2024 06:09 AM     Lab Results   Component Value Date/Time    PROTIME 15.1 11/23/2024 04:30 AM    INR 1.4 11/23/2024 04:30 AM     No results for input(s): \"PROBNP\" in the last 72 hours.  No results for input(s): \"TROPONINI\" in the last 72 hours.  No results for input(s): \"PROCAL\" in the last 72 hours.  This SmartLink has not been